# Patient Record
Sex: FEMALE | Race: WHITE | NOT HISPANIC OR LATINO | Employment: UNEMPLOYED | URBAN - METROPOLITAN AREA
[De-identification: names, ages, dates, MRNs, and addresses within clinical notes are randomized per-mention and may not be internally consistent; named-entity substitution may affect disease eponyms.]

---

## 2022-06-13 ENCOUNTER — OFFICE VISIT (OUTPATIENT)
Dept: URGENT CARE | Facility: CLINIC | Age: 62
End: 2022-06-13
Payer: COMMERCIAL

## 2022-06-13 ENCOUNTER — APPOINTMENT (OUTPATIENT)
Dept: RADIOLOGY | Facility: CLINIC | Age: 62
End: 2022-06-13
Payer: COMMERCIAL

## 2022-06-13 VITALS — OXYGEN SATURATION: 96 % | TEMPERATURE: 97.6 F | HEART RATE: 70 BPM | RESPIRATION RATE: 18 BRPM

## 2022-06-13 DIAGNOSIS — M79.672 LEFT FOOT PAIN: Primary | ICD-10-CM

## 2022-06-13 DIAGNOSIS — M79.672 LEFT FOOT PAIN: ICD-10-CM

## 2022-06-13 PROCEDURE — 73630 X-RAY EXAM OF FOOT: CPT

## 2022-06-13 PROCEDURE — G0382 LEV 3 HOSP TYPE B ED VISIT: HCPCS | Performed by: PHYSICIAN ASSISTANT

## 2022-06-13 NOTE — PROGRESS NOTES
[id number] [email or web address] [facility]        NAME: Nomey Law is a 60 y o  female  : 1960    MRN: [id number]  DATE: 2022  TIME: 9:37 AM    Assessment and Plan   Left foot pain [M79 [id number]]  1  Left foot pain  XR foot 3+ vw left    Cam Boot    Ambulatory Referral to Orthopedic Surgery     Questionable avulsion fracture to dorsum of L foot in area of tenderness  Pt placed in boot and referred to orthopedics  Patient Instructions     Rest, Ice, and Elevate limb  Continue to monitor symptoms  If symptoms do not improve in one week, follow up with orthopedics  Call Maryana Leiva [phone] to schedule and appointment  If new or worsening symptoms occur, go immediately to the ER  Chief Complaint     Chief Complaint   Patient presents with    Foot Pain     Left foot pain after falling last night  History of Present Illness       Leg Pain   Incident onset: Last night  Incident location: Pt slipped on some water at home  She fell forward, landed on her L leg bent under her causing her to hyperextend her L foot and ankle  No pain in Knee  No pain anywhere else except L foot and ankle  Pain scale: Was severe yesterday  Significantly improved today but moderate with weight bearing  Pertinent negatives include no inability to bear weight, loss of motion, numbness or tingling  The symptoms are aggravated by palpation and weight bearing  She has tried rest and non-weight bearing for the symptoms  The treatment provided mild relief  Review of Systems   Review of Systems   Constitutional: Negative  Negative for chills, fatigue and fever  HENT: Negative  Eyes: Negative  Respiratory: Negative  Negative for chest tightness, shortness of breath and wheezing  Cardiovascular: Negative  Negative for chest pain and palpitations  Gastrointestinal: Negative for abdominal pain, constipation, diarrhea, nausea and vomiting  Endocrine: Negative  Genitourinary: Negative    Negative for vaginal pain  Musculoskeletal: Positive for gait problem and joint swelling (L ankle)  Negative for back pain, neck pain and neck stiffness  Skin: Negative  Negative for pallor and rash  Allergic/Immunologic: Negative  Neurological: Negative for tingling, weakness and numbness  Hematological: Negative  Psychiatric/Behavioral: Negative  Current Medications       Current Outpatient Medications:     meloxicam (MOBIC) 15 mg tablet, Take 15 mg by mouth daily, Disp: , Rfl:     Current Allergies     Allergies as of 06/13/2022    (No Known Allergies)            The following portions of the patient's history were reviewed and updated as appropriate: allergies, current medications, past family history, past medical history, past social history, past surgical history and problem list      History reviewed  No pertinent past medical history  Past Surgical History:   Procedure Laterality Date    BUNIONECTOMY Right     TONSILLECTOMY         History reviewed  No pertinent family history  Medications have been verified  Objective   Pulse 70   Temp 97 6 °F (36 4 °C)   Resp 18   SpO2 96%        Physical Exam     Physical Exam  Vitals and nursing note reviewed  Constitutional:       General: She is not in acute distress  Appearance: Normal appearance  She is well-developed  She is not ill-appearing or diaphoretic  HENT:      Head: Normocephalic and atraumatic  Cardiovascular:      Rate and Rhythm: Normal rate and regular rhythm  Heart sounds: Normal heart sounds  Pulmonary:      Effort: Pulmonary effort is normal  No respiratory distress  Breath sounds: Normal breath sounds  No wheezing, rhonchi or rales  Musculoskeletal:      Cervical back: Normal range of motion and neck supple  Left lower leg: No swelling, deformity or tenderness  No edema        Left ankle: Swelling (Mild anterior lateral malleolus) and ecchymosis (Quarter sized to anterior lateral malleolus) present  No deformity or lacerations  Tenderness present over the ATF ligament and AITF ligament  No lateral malleolus, medial malleolus, CF ligament, posterior TF ligament, base of 5th metatarsal or proximal fibula tenderness  Normal range of motion  Anterior drawer test negative  Normal pulse  Left Achilles Tendon: Normal  No tenderness  Left foot: Normal range of motion  No swelling, deformity, tenderness or bony tenderness  Lymphadenopathy:      Cervical: No cervical adenopathy  Skin:     General: Skin is warm  Capillary Refill: Capillary refill takes less than 2 seconds  Coloration: Skin is not pale  Findings: No rash  Neurological:      Mental Status: She is alert

## 2022-06-13 NOTE — PATIENT INSTRUCTIONS
Continue to monitor symptoms  If new or worsening symptoms develop, go immediately to Er  Drink plenty of fluids  Follow up with Family Doctor this week  Avulsion Fracture   WHAT YOU NEED TO KNOW:   An avulsion fracture is when a small piece of bone breaks and pulls away from a larger bone  Part or all of the piece may break away  DISCHARGE INSTRUCTIONS:   Call your local emergency number (911 in the [address] [facility]) if:   You feel lightheaded, short of breath, and have chest pain  You cough up blood  Return to the emergency department if:   Your leg feels warm, tender, and painful  It may look swollen and red  Your cast cracks or is damaged  The pain in your injured limb gets worse even after you rest and take medicine  The skin, toes, or fingers of your injured limb become swollen, cold, or blue  Call your doctor if:   You have numbness or tingling in your hand or foot below your cast     You cannot move your fingers or toes below the cast      You have new sores or redness around your cast or splint  You have new or worsening trouble moving your injured limb  You have questions or concerns about your condition or care  Medicines: You may need any of the following:  Prescription pain medicine  may be given  Ask your healthcare provider how to take this medicine safely  Some prescription pain medicines contain acetaminophen  Do not take other medicines that contain acetaminophen without talking to your healthcare provider  Too much acetaminophen may cause liver damage  Prescription pain medicine may cause constipation  Ask your healthcare provider how to prevent or treat constipation  NSAIDs , such as ibuprofen, help decrease swelling, pain, and fever  This medicine is available with or without a doctor's order  NSAIDs can cause stomach bleeding or kidney problems in certain people  If you take blood thinner medicine, always ask your healthcare provider if NSAIDs are safe for you   Always read the medicine label and follow directions  Take your medicine as directed  Contact your healthcare provider if you think your medicine is not helping or if you have side effects  Tell him or her if you are allergic to any medicine  Keep a list of the medicines, vitamins, and herbs you take  Include the amounts, and when and why you take them  Bring the list or the pill bottles to follow-up visits  Carry your medicine list with you in case of an emergency  Self-care:   Limit activity as directed  Get plenty of rest while your fracture heals  When the pain decreases, begin normal, slow movements  Slowly start to do more each day  Rest when you feel it is needed  Apply ice  on your injury for 15 to 20 minutes every hour or as directed  Use an ice pack, or put crushed ice in a plastic bag  Cover it with a towel  Ice helps prevent tissue damage and decreases swelling and pain  Elevate  your injured limb above the level of your heart as often as you can  This will help decrease swelling and pain  Prop your injured limb on pillows or blankets to keep it elevated comfortably  Use support devices as directed  You may need to use crutches or a walker until your fracture heals  Ask for more information about how to use these walking devices if needed  Bathing with a cast or splint: If you have a cast or splint, it is important not to get it wet  Before bathing, cover the cast or splint with a plastic bag  Tape the bag to your skin above the cast or splint to seal out the water  Hold your arm or leg away from the water in case the bag leaks  Ask when it is okay to take a bath or shower  Cast or splint care:   Check the skin around the cast or splint every day  Do not push down or lean on any part of the cast or splint because it may break  Do not use a sharp or pointed object to scratch your skin under the cast or splint      Physical therapy:  A physical therapist may teach you exercises to strengthen your injured limb once the pain is gone  Follow up with your doctor as directed: You will need to return for more tests to see how well your fracture is healing  Write down your questions so you remember to ask them during your visits  © Copyright [employer] 2022 Information is for End User's use only and may not be sold, redistributed or otherwise used for commercial purposes  All illustrations and images included in CareNotes® are the copyrighted property of [employer]  or [employer]  The above information is an  only  It is not intended as medical advice for individual conditions or treatments  Talk to your doctor, nurse or pharmacist before following any medical regimen to see if it is safe and effective for you

## 2023-01-30 ENCOUNTER — TELEPHONE (OUTPATIENT)
Dept: ADMINISTRATIVE | Facility: OTHER | Age: 63
End: 2023-01-30

## 2023-01-30 ENCOUNTER — OFFICE VISIT (OUTPATIENT)
Dept: FAMILY MEDICINE CLINIC | Facility: CLINIC | Age: 63
End: 2023-01-30

## 2023-01-30 VITALS
DIASTOLIC BLOOD PRESSURE: 80 MMHG | OXYGEN SATURATION: 99 % | BODY MASS INDEX: 42.11 KG/M2 | HEART RATE: 78 BPM | WEIGHT: 262 LBS | HEIGHT: 66 IN | TEMPERATURE: 98 F | RESPIRATION RATE: 20 BRPM | SYSTOLIC BLOOD PRESSURE: 132 MMHG

## 2023-01-30 DIAGNOSIS — E66.01 CLASS 3 SEVERE OBESITY DUE TO EXCESS CALORIES WITHOUT SERIOUS COMORBIDITY WITH BODY MASS INDEX (BMI) OF 40.0 TO 44.9 IN ADULT (HCC): ICD-10-CM

## 2023-01-30 DIAGNOSIS — Z12.31 ENCOUNTER FOR SCREENING MAMMOGRAM FOR MALIGNANT NEOPLASM OF BREAST: ICD-10-CM

## 2023-01-30 DIAGNOSIS — Z82.49 FAMILY HISTORY OF ABDOMINAL AORTIC ANEURYSM (AAA): ICD-10-CM

## 2023-01-30 DIAGNOSIS — Z23 ENCOUNTER FOR IMMUNIZATION: ICD-10-CM

## 2023-01-30 DIAGNOSIS — R00.2 PALPITATIONS: ICD-10-CM

## 2023-01-30 DIAGNOSIS — Z12.11 SCREENING FOR COLON CANCER: ICD-10-CM

## 2023-01-30 DIAGNOSIS — R06.02 SHORTNESS OF BREATH: ICD-10-CM

## 2023-01-30 DIAGNOSIS — Z13.6 SCREENING FOR AAA (ABDOMINAL AORTIC ANEURYSM): ICD-10-CM

## 2023-01-30 DIAGNOSIS — Z00.00 ANNUAL PHYSICAL EXAM: Primary | ICD-10-CM

## 2023-01-30 DIAGNOSIS — Z11.59 ENCOUNTER FOR HEPATITIS C SCREENING TEST FOR LOW RISK PATIENT: ICD-10-CM

## 2023-01-30 DIAGNOSIS — Z13.220 SCREENING CHOLESTEROL LEVEL: ICD-10-CM

## 2023-01-30 DIAGNOSIS — Z12.4 SCREENING FOR CERVICAL CANCER: ICD-10-CM

## 2023-01-30 NOTE — TELEPHONE ENCOUNTER
Upon review of the In Basket request we were able to locate, review, and update the patient chart as requested for Mammogram and Pap Smear (HPV) aka Cervical Cancer Screening  Any additional questions or concerns should be emailed to the Practice Liaisons via the appropriate education email address, please do not reply via In Basket      Thank you  Misbah Braun

## 2023-01-30 NOTE — PROGRESS NOTES
HPI:   Angella Burger is a 58 y o  female here for her yearly health maintenance exam    There is no problem list on file for this patient  History reviewed  No pertinent past medical history  Pt reports a few times over the last couple of months have periods of "racing heart", shortness of breath, dizziness     Pt's dad passed away at 68 from a ruptured aortic aneurysm  Pt's grandfather also had cardiovascular issues - she is wondering if there is preventative screenings for cardiovascular disease  Discussed possible options such as stress tests, echocardiogram etc  Due to family history of aortic aneurysm, could consider AAA screening  1  Advanced Directive: na     2  Durable Power of  for Healthcare: na     3  Social History:               Marital History: - Nazia Guerra              Work Status: Retired from - [employer] 5 days a week              Drug and alcohol History: abuse              Alcohol Use: social     4  General Health: good              Regular Dental Visits:yes              Vision problems:none              Hearing Loss:none              Immunizations up to date: TDAP                 Lifestyle:                           Healthy Diet:no allergies, limited junk food (no soda, no chips), enjoys sweets                          Tobacco Use:none                          Regular exercise:walking, has a treadmill                              PHQ-2/9 Depression Screening    Little interest or pleasure in doing things: 0 - not at all  Feeling down, depressed, or hopeless: 0 - not at all  PHQ-2 Score: 0  PHQ-2 Interpretation: Negative depression screen           Current Outpatient Medications   Medication Sig Dispense Refill   • meloxicam (MOBIC) 15 mg tablet Take 15 mg by mouth daily       No current facility-administered medications for this visit       Allergies   Allergen Reactions   • Cat Hair Extract Other (See Comments)     Immunization History Administered Date(s) Administered   • COVID-19 MODERNA VACC 0 5 ML IM 04/20/2021, 05/18/2021   • Tdap 01/30/2023       Patient Care Team:  Parisa Aguilar as PCP - General (Family Medicine)    Review of Systems   Constitutional: Negative for activity change, diaphoresis, fatigue and fever  HENT: Negative for congestion, facial swelling, hearing loss, rhinorrhea, sinus pressure, sinus pain, sneezing, sore throat and voice change  Eyes: Negative for discharge and visual disturbance  Respiratory: Positive for shortness of breath  Negative for cough, choking, chest tightness, wheezing and stridor  Cardiovascular: Positive for palpitations  Negative for chest pain and leg swelling  Gastrointestinal: Negative for abdominal distention, abdominal pain, constipation, diarrhea, nausea and vomiting  Endocrine: Negative for polydipsia, polyphagia and polyuria  Genitourinary: Negative for difficulty urinating, dysuria, frequency and urgency  Musculoskeletal: Negative for arthralgias, back pain, gait problem, joint swelling, myalgias, neck pain and neck stiffness  Chronic bilateral knee pain   Skin: Negative for color change, rash and wound  Neurological: Positive for dizziness  Negative for syncope, speech difficulty, weakness, light-headedness and headaches  Hematological: Negative for adenopathy  Does not bruise/bleed easily  Psychiatric/Behavioral: Negative for agitation, behavioral problems, confusion, hallucinations, sleep disturbance and suicidal ideas  The patient is not nervous/anxious  Physical Exam :  Physical Exam  Vitals and nursing note reviewed  Constitutional:       General: She is not in acute distress  Appearance: She is well-developed  She is obese  She is not diaphoretic  HENT:      Head: Normocephalic and atraumatic        Right Ear: Tympanic membrane, ear canal and external ear normal       Left Ear: Tympanic membrane, ear canal and external ear normal  Nose: Nose normal       Right Sinus: No maxillary sinus tenderness or frontal sinus tenderness  Left Sinus: No maxillary sinus tenderness or frontal sinus tenderness  Mouth/Throat:      Pharynx: Uvula midline  No oropharyngeal exudate  Eyes:      General:         Right eye: No discharge  Left eye: No discharge  Conjunctiva/sclera: Conjunctivae normal       Pupils: Pupils are equal, round, and reactive to light  Neck:      Thyroid: No thyromegaly  Trachea: No tracheal deviation  Cardiovascular:      Rate and Rhythm: Normal rate and regular rhythm  Heart sounds: No murmur heard  No friction rub  No gallop  Pulmonary:      Effort: Pulmonary effort is normal  No respiratory distress  Breath sounds: Normal breath sounds  No wheezing or rales  Abdominal:      General: Bowel sounds are normal  There is no distension  Palpations: Abdomen is soft  There is no mass  Tenderness: There is no abdominal tenderness  There is no guarding or rebound  Musculoskeletal:         General: No tenderness or deformity  Normal range of motion  Cervical back: Normal range of motion and neck supple  Right lower leg: No edema  Left lower leg: No edema  Lymphadenopathy:      Cervical: No cervical adenopathy  Skin:     General: Skin is warm and dry  Findings: No erythema or rash  Neurological:      Mental Status: She is alert and oriented to person, place, and time  Cranial Nerves: No cranial nerve deficit  Coordination: Coordination normal    Psychiatric:         Speech: Speech normal          Behavior: Behavior normal          Thought Content: Thought content normal          Judgment: Judgment normal        BMI Counseling: Body mass index is 42 29 kg/m²   The BMI is above normal  Nutrition recommendations include decreasing portion sizes, encouraging healthy choices of fruits and vegetables, decreasing fast food intake, consuming healthier snacks, limiting drinks that contain sugar, moderation in carbohydrate intake and increasing intake of lean protein  Exercise recommendations include exercising 3-5 times per week  Rationale for BMI follow-up plan is due to patient being overweight or obese  Depression Screening and Follow-up Plan: Patient was screened for depression during today's encounter  They screened negative with a PHQ-2 score of 0  Reviewed Updated [facility] Prior Wellness Visits:   Last Health Maintenance visit information was reviewed, patient interviewed , no change since last HM visit no  Last HM visit information was reviewed, patient interviewed and updates made to the record today no    Assessment and Plan:  1  Annual physical exam  Comprehensive metabolic panel    CBC and differential      2  Screening for colon cancer  Cologuard      3  Encounter for screening mammogram for malignant neoplasm of breast  Mammo screening bilateral w 3d & cad      4  Screening for cervical cancer        5  Screening cholesterol level  Lipid panel      6  Encounter for hepatitis C screening test for low risk patient  Hepatitis C antibody      7  Palpitations  Stress test only, exercise      8  Family history of abdominal aortic aneurysm (AAA)  US abdominal aorta screening aaa      9  Screening for AAA (abdominal aortic aneurysm)  US abdominal aorta screening aaa      10  Shortness of breath  Stress test only, exercise      11   Encounter for immunization  TDAP VACCINE GREATER THAN OR EQUAL TO 8YO IM      12  Class 3 severe obesity due to excess calories without serious comorbidity with body mass index (BMI) of 40 0 to 44 9 in adult [facility])            Health Maintenance Due   Topic Date Due   • Hepatitis C Screening  Never done   • HIV Screening  Never done   • Cervical Cancer Screening  Never done   • Breast Cancer Screening: Mammogram  Never done   • Colorectal Cancer Screening  Never done   • COVID-19 Vaccine (3 - Booster for Moderna series) 07/13/2021   • Influenza Vaccine (1) Never done

## 2023-01-30 NOTE — PATIENT INSTRUCTIONS
Please complete cologuard as discussed  Please have mammogram completed  Please have fasting blood work completed at your convenience  TDAP today  I recommend following up with your OBGYN for papsmear  We will schedule your stress test and AAA screening for you, please have completed as discussed  Call with any questions or concerns  Follow up in 1 year as scheduled or as needed

## 2023-01-30 NOTE — TELEPHONE ENCOUNTER
----- Message from Priscilla Hartley [place] sent at 1/30/2023  7:06 AM EST -----  Regarding: care gap request mammogram  01/30/23 7:06 AM    Hello, our patient attached above has had Mammogram completed/performed  Please assist in updating the patient chart by pulling the Care Everywhere (CE) document  The date of service is 8/1/2019       Thank you,  Liseth Villanueva

## 2023-01-30 NOTE — TELEPHONE ENCOUNTER
----- Message from Nicole Waller MA sent at 1/30/2023  9:12 AM EST -----  Regarding: Thin Prep  01/30/23 9:13 AM    Hello, our patient No patient name on file  has had Pap Smear (HPV) aka Cervical Cancer Screening completed/performed  Please assist in updating the patient chart by pulling the Care Everywhere (CE) document  The date of service is 3/18/2018       Thank you,  Nicole Waller MA  [facility] PRIMARY [facility]

## 2023-02-01 ENCOUNTER — HOSPITAL ENCOUNTER (OUTPATIENT)
Dept: MAMMOGRAPHY | Facility: HOSPITAL | Age: 63
Discharge: HOME | End: 2023-02-01

## 2023-02-01 VITALS — BODY MASS INDEX: 42.16 KG/M2 | WEIGHT: 262.35 LBS | HEIGHT: 66 IN

## 2023-02-01 DIAGNOSIS — Z12.31 ENCOUNTER FOR SCREENING MAMMOGRAM FOR MALIGNANT NEOPLASM OF BREAST: ICD-10-CM

## 2023-02-22 ENCOUNTER — HOSPITAL ENCOUNTER (OUTPATIENT)
Dept: ULTRASOUND IMAGING | Facility: HOSPITAL | Age: 63
Discharge: HOME | End: 2023-02-22

## 2023-02-22 ENCOUNTER — HOSPITAL ENCOUNTER (OUTPATIENT)
Dept: NON INVASIVE DIAGNOSTICS | Facility: HOSPITAL | Age: 63
Discharge: HOME | End: 2023-02-22

## 2023-02-22 VITALS
WEIGHT: 262 LBS | HEART RATE: 59 BPM | BODY MASS INDEX: 42.11 KG/M2 | DIASTOLIC BLOOD PRESSURE: 86 MMHG | RESPIRATION RATE: 16 BRPM | OXYGEN SATURATION: 97 % | HEIGHT: 66 IN | SYSTOLIC BLOOD PRESSURE: 174 MMHG

## 2023-02-22 DIAGNOSIS — Z13.6 SCREENING FOR AAA (ABDOMINAL AORTIC ANEURYSM): ICD-10-CM

## 2023-02-22 DIAGNOSIS — R06.02 SHORTNESS OF BREATH: ICD-10-CM

## 2023-02-22 DIAGNOSIS — R00.2 PALPITATIONS: ICD-10-CM

## 2023-02-22 DIAGNOSIS — Z82.49 FAMILY HISTORY OF ABDOMINAL AORTIC ANEURYSM (AAA): ICD-10-CM

## 2023-02-22 LAB
CHEST PAIN STATEMENT: NORMAL
MAX DIASTOLIC BP: 100 MMHG
MAX HEART RATE: 146 BPM
MAX HR PERCENT: 93 %
MAX HR: 146 BPM
MAX PREDICTED HEART RATE: 156 BPM
MAX. SYSTOLIC BP: 200 MMHG
PROTOCOL NAME: NORMAL
RATE PRESSURE PRODUCT: NORMAL
REASON FOR TERMINATION: NORMAL
SL CV STRESS RECOVERY BP: NORMAL MMHG
SL CV STRESS RECOVERY HR: 88 BPM
SL CV STRESS RECOVERY O2 SAT: 98 %
SL CV STRESS STAGE REACHED: 2
STRESS ANGINA INDEX: 0
STRESS BASELINE BP: NORMAL MMHG
STRESS BASELINE HR: 59 BPM
STRESS O2 SAT REST: 97 %
STRESS PEAK HR: 142 BPM
STRESS POST ESTIMATED WORKLOAD: 7 METS
STRESS POST EXERCISE DUR MIN: 6 MIN
STRESS POST EXERCISE DUR SEC: 1 SEC
STRESS POST O2 SAT PEAK: 95 %
STRESS POST PEAK BP: 196 MMHG
TARGET HR FORMULA: NORMAL
TEST INDICATION: NORMAL
TIME IN EXERCISE PHASE: NORMAL

## 2023-09-29 ENCOUNTER — OFFICE VISIT (OUTPATIENT)
Dept: FAMILY MEDICINE CLINIC | Facility: CLINIC | Age: 63
End: 2023-09-29
Payer: COMMERCIAL

## 2023-09-29 ENCOUNTER — TELEPHONE (OUTPATIENT)
Dept: FAMILY MEDICINE CLINIC | Facility: CLINIC | Age: 63
End: 2023-09-29

## 2023-09-29 VITALS
DIASTOLIC BLOOD PRESSURE: 80 MMHG | BODY MASS INDEX: 42.43 KG/M2 | HEART RATE: 79 BPM | WEIGHT: 264 LBS | TEMPERATURE: 97.6 F | OXYGEN SATURATION: 95 % | SYSTOLIC BLOOD PRESSURE: 138 MMHG | HEIGHT: 66 IN

## 2023-09-29 DIAGNOSIS — L30.8 OTHER ECZEMA: ICD-10-CM

## 2023-09-29 DIAGNOSIS — B96.89 BACTERIAL URI: Primary | ICD-10-CM

## 2023-09-29 DIAGNOSIS — G47.33 OBSTRUCTIVE SLEEP APNEA SYNDROME: ICD-10-CM

## 2023-09-29 DIAGNOSIS — J06.9 BACTERIAL URI: Primary | ICD-10-CM

## 2023-09-29 PROCEDURE — 99214 OFFICE O/P EST MOD 30 MIN: CPT | Performed by: NURSE PRACTITIONER

## 2023-09-29 RX ORDER — DEXTROMETHORPHAN HYDROBROMIDE AND PROMETHAZINE HYDROCHLORIDE 15; 6.25 MG/5ML; MG/5ML
5 SYRUP ORAL 4 TIMES DAILY PRN
Qty: 240 ML | Refills: 0 | Status: SHIPPED | OUTPATIENT
Start: 2023-09-29

## 2023-09-29 RX ORDER — AZITHROMYCIN 250 MG/1
TABLET, FILM COATED ORAL
Qty: 6 TABLET | Refills: 0 | Status: SHIPPED | OUTPATIENT
Start: 2023-09-29 | End: 2023-10-03

## 2023-09-29 RX ORDER — TRIAMCINOLONE ACETONIDE 5 MG/G
CREAM TOPICAL 3 TIMES DAILY
Qty: 15 G | Refills: 1 | Status: SHIPPED | OUTPATIENT
Start: 2023-09-29

## 2023-09-29 RX ORDER — PREDNISONE 20 MG/1
20 TABLET ORAL 2 TIMES DAILY WITH MEALS
Qty: 10 TABLET | Refills: 0 | Status: SHIPPED | OUTPATIENT
Start: 2023-09-29 | End: 2023-10-04

## 2023-09-29 RX ORDER — PREDNISONE 20 MG/1
20 TABLET ORAL 2 TIMES DAILY WITH MEALS
Qty: 10 TABLET | Refills: 0 | Status: SHIPPED | OUTPATIENT
Start: 2023-09-29 | End: 2023-09-29 | Stop reason: SDUPTHER

## 2023-09-29 RX ORDER — AZITHROMYCIN 250 MG/1
TABLET, FILM COATED ORAL
Qty: 6 TABLET | Refills: 0 | Status: SHIPPED | OUTPATIENT
Start: 2023-09-29 | End: 2023-09-29 | Stop reason: SDUPTHER

## 2023-09-29 RX ORDER — DEXTROMETHORPHAN HYDROBROMIDE AND PROMETHAZINE HYDROCHLORIDE 15; 6.25 MG/5ML; MG/5ML
5 SYRUP ORAL 4 TIMES DAILY PRN
Qty: 240 ML | Refills: 0 | Status: SHIPPED | OUTPATIENT
Start: 2023-09-29 | End: 2023-09-29 | Stop reason: SDUPTHER

## 2023-09-29 NOTE — TELEPHONE ENCOUNTER
Patient called office looking for appt today with [facility] for a cold she has had for two weeks.  Rocío fitting her in at 9 am

## 2023-09-29 NOTE — PROGRESS NOTES
[facility]        NAME: Julia Burk is a 58 y.o. female  : 1960    MRN: [id number]  DATE: 2023  TIME: 9:18 AM    Assessment and Plan   Bacterial URI [J06.9, B96.89]  1. Bacterial URI  azithromycin (ZITHROMAX) 250 mg tablet    predniSONE 20 mg tablet    promethazine-dextromethorphan (PHENERGAN-DM) 6.25-15 mg/5 mL oral syrup    DISCONTINUED: promethazine-dextromethorphan (PHENERGAN-DM) 6.25-15 mg/5 mL oral syrup    DISCONTINUED: azithromycin (ZITHROMAX) 250 mg tablet    DISCONTINUED: predniSONE 20 mg tablet      2. Obstructive sleep apnea syndrome  Ambulatory Referral to Sleep Medicine      3. Other eczema  triamcinolone (KENALOG) 0.5 % cream            Patient Instructions     Patient Instructions   Sleep referral given  Zithromax, prednisone, promethazine DM cough medications as directed  Triamcinolone cream as ordered  Call for problems/concerns          Chief Complaint     Chief Complaint   Patient presents with   • Shortness of Breath     At night is the worst     • Nasal Congestion     Ongoing for 2 weeks  Did a home covid- negative   • Cough         History of Present Illness       Cough/congestion x 2 weeks. Cough worse at night when lying down. Caesar Holes but no relief. No recent antibiotics. Review of Systems   Review of Systems   Constitutional: Positive for fatigue. Negative for chills and fever. HENT: Positive for congestion, postnasal drip, rhinorrhea and sinus pressure. Negative for sore throat. Eyes: Negative for pain, discharge and redness. Respiratory: Positive for cough. Negative for wheezing. Cardiovascular: Negative for chest pain. Gastrointestinal: Negative for constipation, diarrhea, nausea and vomiting. Musculoskeletal: Negative for myalgias. Skin: Positive for rash (right ankle since receiving covid vaccine). Neurological: Positive for headaches. Negative for dizziness.          Current Medications       Current Outpatient Medications:   •  azithromycin (ZITHROMAX) 250 mg tablet, Take 2 tablets today then 1 tablet daily x 4 days, Disp: 6 tablet, Rfl: 0  •  meloxicam (MOBIC) 15 mg tablet, Take 15 mg by mouth daily, Disp: , Rfl:   •  predniSONE 20 mg tablet, Take 1 tablet (20 mg total) by mouth 2 (two) times a day with meals for 5 days, Disp: 10 tablet, Rfl: 0  •  promethazine-dextromethorphan (PHENERGAN-DM) 6.25-15 mg/5 mL oral syrup, Take 5 mL by mouth 4 (four) times a day as needed for cough, Disp: 240 mL, Rfl: 0  •  triamcinolone (KENALOG) 0.5 % cream, Apply topically 3 (three) times a day, Disp: 15 g, Rfl: 1    Current Allergies     Allergies as of 09/29/2023 - Reviewed 09/29/2023   Allergen Reaction Noted   • Cat hair extract Other (See Comments) 12/04/2015            The following portions of the patient's history were reviewed and updated as appropriate: allergies, current medications, past family history, past medical history, past social history, past surgical history and problem list.     Past Medical History:   Diagnosis Date   • Dizziness    • Palpitations    • SOB (shortness of breath)    • Tachycardia        Past Surgical History:   Procedure Laterality Date   • BUNIONECTOMY Right    • TONSILLECTOMY         Family History   Problem Relation Age of Onset   • No Known Problems Mother    • No Known Problems Father    • No Known Problems Sister    • No Known Problems Daughter    • No Known Problems Daughter    • No Known Problems Maternal Grandmother    • No Known Problems Maternal Grandfather    • No Known Problems Paternal Grandmother    • No Known Problems Paternal Grandfather    • No Known Problems Son    • No Known Problems Maternal Aunt    • No Known Problems Maternal Aunt    • No Known Problems Maternal Aunt    • No Known Problems Paternal Aunt          Medications have been verified.         Objective   /80   Pulse 79   Temp 97.6 °F (36.4 °C) (Tympanic)   Ht 5' 6" (1.676 m)   Wt 120 kg (264 lb)   SpO2 95% BMI 42.61 kg/m²        Physical Exam     Physical Exam  Vitals and nursing note reviewed. Constitutional:       General: She is not in acute distress. Appearance: She is well-developed. She is not diaphoretic. HENT:      Right Ear: Tympanic membrane, ear canal and external ear normal.      Left Ear: Tympanic membrane, ear canal and external ear normal.      Nose: Congestion present. Mouth/Throat:      Mouth: Mucous membranes are moist.      Pharynx: Uvula midline. Eyes:      General:         Right eye: No discharge. Left eye: No discharge. Neck:      Thyroid: No thyromegaly. Cardiovascular:      Rate and Rhythm: Normal rate and regular rhythm. Heart sounds: Normal heart sounds. No friction rub. No gallop. Pulmonary:      Effort: Pulmonary effort is normal. No respiratory distress. Breath sounds: Normal breath sounds. No wheezing or rales. Musculoskeletal:         General: No tenderness or deformity. Normal range of motion. Cervical back: Normal range of motion and neck supple. Lymphadenopathy:      Cervical: No cervical adenopathy. Skin:     General: Skin is warm and dry. Findings: Erythema and rash (top of right/left ankle- dry, scaly, erythematic) present. Neurological:      Mental Status: She is alert and oriented to person, place, and time. Cranial Nerves: No cranial nerve deficit. Coordination: Coordination normal.   Psychiatric:         Mood and Affect: Mood normal.         Behavior: Behavior normal.         Thought Content:  Thought content normal.         Judgment: Judgment normal.

## 2023-09-29 NOTE — PATIENT INSTRUCTIONS
Sleep referral given  Zithromax, prednisone, promethazine DM cough medications as directed  Triamcinolone cream as ordered  Call for problems/concerns

## 2023-10-11 ENCOUNTER — OFFICE VISIT (OUTPATIENT)
Dept: SLEEP CENTER | Facility: CLINIC | Age: 63
End: 2023-10-11
Payer: COMMERCIAL

## 2023-10-11 VITALS
SYSTOLIC BLOOD PRESSURE: 126 MMHG | BODY MASS INDEX: 42.75 KG/M2 | OXYGEN SATURATION: 95 % | WEIGHT: 266 LBS | DIASTOLIC BLOOD PRESSURE: 84 MMHG | HEIGHT: 66 IN | HEART RATE: 81 BPM

## 2023-10-11 DIAGNOSIS — G25.81 RLS (RESTLESS LEGS SYNDROME): ICD-10-CM

## 2023-10-11 DIAGNOSIS — R06.81 WITNESSED EPISODE OF APNEA: ICD-10-CM

## 2023-10-11 DIAGNOSIS — E66.01 MORBID OBESITY (HCC): ICD-10-CM

## 2023-10-11 DIAGNOSIS — G47.19 EXCESSIVE DAYTIME SLEEPINESS: ICD-10-CM

## 2023-10-11 DIAGNOSIS — R06.83 SNORING: Primary | ICD-10-CM

## 2023-10-11 DIAGNOSIS — J34.2 DEVIATED NASAL SEPTUM: ICD-10-CM

## 2023-10-11 PROCEDURE — 99204 OFFICE O/P NEW MOD 45 MIN: CPT | Performed by: INTERNAL MEDICINE

## 2023-10-11 NOTE — PATIENT INSTRUCTIONS
What is SHIVA? Obstructive sleep apnea is a common and serious sleep disorder that causes you to stop breathing during sleep. The airway repeatedly becomes blocked, limiting the amount of air that reaches your lungs. When this happens, you may snore loudly or making choking noises as you try to breathe. Your brain and body becomes oxygen deprived and you may wake up. This may happen a few times a night, or in more severe cases, several hundred times a night. Sleep apnea can make you wake up in the morning feeling tired or unrefreshed even though you have had a full night of sleep. During the day, you may feel fatigued, have difficulty concentrating or you may even unintentionally fall asleep. This is because your body is waking up numerous times throughout the night, even though you might not be conscious of each awakening. The lack of oxygen your body receives can have negative long-term consequences for your health. This includes:  High blood pressure  Heart disease  Irregular heart rhythms  Stroke  Pre-diabetes and diabetes  Depression  Testing  An objective evaluation of your sleep may be needed before your board certified sleep physician can make a diagnosis. Options include:   In-lab overnight sleep study  This type of sleep study requires you to stay overnight at a sleep center, in a bed that may resemble a hotel room. You will sleep with sensors hooked up to various parts of your body. These sensors record your brain waves, heartbeat, breathing and movement. An overnight sleep study also provides your doctor with the most complete information about your sleep. Learn more about an overnight sleep study. Home sleep apnea test  Some patients with high risk factors for obstructive sleep apnea and no other medical disorders may be candidates for a home sleep apnea test. The testing equipment differs in that it is less complicated than what is used in an overnight sleep study.  As such, does not give all the data an in-lab will and if negative, may not mean you do not have the problem. Treatment for sleep apnea includes using a continuous positive airway pressure (CPAP) machine to keep your airway open during sleep. A mask is placed over your nose and mouth, or just your nose. The mask is hooked to the CPAP machine that blows a gentle stream of air into the mask when you breathe. This helps keep your airway open so you can breathe more regularly. Extra oxygen may be given to you through the machine. You may be given a mouth device. It looks like a mouth guard or dental retainer and stops your tongue and mouth tissues from blocking your throat while you sleep. Surgery may be needed to remove extra tissues that block your mouth, throat, or nose. Manage sleep apnea:   Do not smoke. Nicotine and other chemicals in cigarettes and cigars can cause lung damage. Ask your healthcare provider for information if you currently smoke and need help to quit. E-cigarettes or smokeless tobacco still contain nicotine. Talk to your healthcare provider before you use these products. Do not drink alcohol or take sedative medicine before you go to sleep. Alcohol and sedatives can relax the muscles and tissues around your throat. This can block the airflow to your lungs. Maintain a healthy weight. Excess tissue around your throat may restrict your breathing. Ask your healthcare provider for information if you need to lose weight. Sleep on your side or use pillows designed to prevent sleep apnea. This prevents your tongue or other tissues from blocking your throat. You can also raise the head of your bed. Driving Safety. Refrain from driving when drowsy. Follow up with your healthcare provider as directed: Write down your questions so you remember to ask them during your visits. Go to AASM website for more information: Sleepeducation. org  What is SHIVA?    Obstructive sleep apnea is a common and serious sleep disorder that causes you to stop breathing during sleep. The airway repeatedly becomes blocked, limiting the amount of air that reaches your lungs. When this happens, you may snore loudly or making choking noises as you try to breathe. Your brain and body becomes oxygen deprived and you may wake up. This may happen a few times a night, or in more severe cases, several hundred times a night. Sleep apnea can make you wake up in the morning feeling tired or unrefreshed even though you have had a full night of sleep. During the day, you may feel fatigued, have difficulty concentrating or you may even unintentionally fall asleep. This is because your body is waking up numerous times throughout the night, even though you might not be conscious of each awakening. The lack of oxygen your body receives can have negative long-term consequences for your health. This includes:  High blood pressure  Heart disease  Irregular heart rhythms  Stroke  Pre-diabetes and diabetes  Depression  Testing  An objective evaluation of your sleep may be needed before your board certified sleep physician can make a diagnosis. Options include:   In-lab overnight sleep study  This type of sleep study requires you to stay overnight at a sleep center, in a bed that may resemble a hotel room. You will sleep with sensors hooked up to various parts of your body. These sensors record your brain waves, heartbeat, breathing and movement. An overnight sleep study also provides your doctor with the most complete information about your sleep. Learn more about an overnight sleep study. Home sleep apnea test  Some patients with high risk factors for obstructive sleep apnea and no other medical disorders may be candidates for a home sleep apnea test. The testing equipment differs in that it is less complicated than what is used in an overnight sleep study. As such, does not give all the data an in-lab will and if negative, may not mean you do not have the problem.   Treatment for sleep apnea includes using a continuous positive airway pressure (CPAP) machine to keep your airway open during sleep. A mask is placed over your nose and mouth, or just your nose. The mask is hooked to the CPAP machine that blows a gentle stream of air into the mask when you breathe. This helps keep your airway open so you can breathe more regularly. Extra oxygen may be given to you through the machine. You may be given a mouth device. It looks like a mouth guard or dental retainer and stops your tongue and mouth tissues from blocking your throat while you sleep. Surgery may be needed to remove extra tissues that block your mouth, throat, or nose. Manage sleep apnea:   Do not smoke. Nicotine and other chemicals in cigarettes and cigars can cause lung damage. Ask your healthcare provider for information if you currently smoke and need help to quit. E-cigarettes or smokeless tobacco still contain nicotine. Talk to your healthcare provider before you use these products. Do not drink alcohol or take sedative medicine before you go to sleep. Alcohol and sedatives can relax the muscles and tissues around your throat. This can block the airflow to your lungs. Maintain a healthy weight. Excess tissue around your throat may restrict your breathing. Ask your healthcare provider for information if you need to lose weight. Sleep on your side or use pillows designed to prevent sleep apnea. This prevents your tongue or other tissues from blocking your throat. You can also raise the head of your bed. Driving Safety. Refrain from driving when drowsy. Follow up with your healthcare provider as directed: Write down your questions so you remember to ask them during your visits. Go to AASM website for more information: Sleepeducation. org  Nursing Support:  When: Monday through Friday 7A-5PM except holidays  Where: Our direct line is [phone]. If you are having a true emergency please call 911.   In the event that the line is busy or it is after hours please leave a voice message and we will return your call. Please speak clearly, leaving your full name, birth date, best number to reach you and the reason for your call. Medication refills: We will need the name of the medication, the dosage, the ordering provider, whether you get a 30 or 90 day refill, and the pharmacy name and address. Medications will be ordered by the provider only. Nurses cannot call in prescriptions. Please allow 7 days for medication refills. Physician requested updates: If your provider requested that you call with an update after starting medication, please be ready to provide us the medication and dosage, what time you take your medication, the time you attempt to fall asleep, time you fall asleep, when you wake up, and what time you get out of bed. Sleep Study Results: We will contact you with sleep study results and/or next steps after the physician has reviewed your testing.

## 2023-10-11 NOTE — PROGRESS NOTES
Consultation - [id number]  58 y.o. female  MXK:8[birth year]  CQP:[id number]  DOS:10/11/2023    Physician Requesting Consult: Charmayne Kim, [facility]              Reason for Consult : At your kind request I saw Mayte Short for initial sleep evaluation today. The patient is here to evaluate for suspected Obstructive Sleep Apnea. PFSH, Problem List, Medications & Allergies were reviewed in EMR. Miguel Rudd  has a past medical history of Dizziness, Palpitations, SOB (shortness of breath), and Tachycardia. She has a current medication list which includes the following prescription(s): meloxicam, promethazine-dextromethorphan, and triamcinolone. HPI: Patient's stated reason for visit Snoring/breathing pauses in sleep.]  Symptoms have been ongoing for several years. Miguel Rudd is aware of Snore loudly, Stop breathing and awakens herself mainly when supine. Additional Complaints: None. Restless Leg Syndrome: has typical symptoms but occurs infrequently and not affecting sleep;  Parasomnia: no features reported    Sleep Routine (averaged): Typical Bedtime: 10-11 PM.  Gets OOB: 6-7 AM. TIB:>7 hrs. Sleep latency:< 15 minutes Sleep Interruptions: 1 to 2 times per night,  because of nocturia and able to fall back asleep. Awakens: spontaneously  Upon awakening: is not always refreshed. She estimates getting 7 hrs sleep. Daytime Function:Makayla reports excessive daytime sleepiness yes feels like napping & does when has the opportunity and regularly naps for 20 to 60 minutes. . She rated herself at Total score: 15 /24 on the [place] Sleepiness Scale. Habits:   reports that she quit smoking about 41 years ago. Her smoking use included cigarettes. She has never used smokeless tobacco.;  reports current alcohol use.; Reports no history of drug use.;  E-Cigarette/Vaping    E-Cigarette Use  Never User; Caffeine use:rarely; Exercise routine: none.     Occupation: Homemaker   Family History: Suspects father may have had obstructive sleep apnea  ROS: Significant for steady weight gain and around 10 pounds in the past year. She reports constant nasal congestion, shortness of breath and wheezing. She reported no cardiac symptoms. EXAM:  /84 (BP Location: Left arm, Patient Position: Sitting, Cuff Size: Large)   Pulse 81   Ht 5' 6" (1.676 m)   Wt 121 kg (266 lb)   SpO2 95%   BMI 42.93 kg/m²    General: Well groomed female, well appearing, in no apparent distress. Neurological: Alert and orientated; cooperative; Cranial nerves intact;    Psychiatric: Speech: Clear and coherent; normal mood, affect & thought   Skin: Warm and dry; Color& Hydration good; no facial rashes or lesions   HEENT:  Craniofacial anatomy: obvious overjet Sinuses: Non-tender. TMJ: Normal    Eyes: EOM's intact; conjunctiva/corneas clear   Ears: Externally appear normal     Nasal Airway: assymetric nares Septum: Deviated; Mucosa: Normal; Turbinates: Normal; Rhinorrhea: None  Mouth: Lips: Normal posture; Dentition: worn down and irregular. Mucosa: Moist; Hard Palate:narrow   Oropharryx: crowded and AP narrowing Tongue: Mallampati:Class IV and MobileSoft Palate:  redundant  and Uvular Hypertrophy Tonsils: absent  Neck:; Neck Circumference: 15 "; supple; no abnormal masses; Thyroid: Normal. Trachea: Central.    Lymph: No cervical or submandibular Lymhadenopathy  Heart: S1,S2 normal; RRR; no gallop; no murmur   Lungs: Respiratory Effort: Normal. Air entry good bilaterally. No wheezes. No rales  Abdomen: Obese, soft & non-tender    Extremities: No pedal edema. No clubbing or cyanosis. Musculoskeletal:  Motor normal; Gait: Normal.       There are no recent labs for review. IMPRESSION: Primary/Secondary Sleep Diagnoses (to Medical or Psych conditions) & Comorbidities   1. Snoring  Diagnostic Sleep Study      2. Witnessed episode of apnea  Diagnostic Sleep Study      3. RLS (restless legs syndrome)  Diagnostic Sleep Study      4.  Excessive daytime sleepiness        5. Deviated nasal septum  Diagnostic Sleep Study      6. Morbid obesity ([address])             PLAN:   1. With respect to above conditions, comprehensive counseling provided on pathophysiology; effects on symptoms and comorbidities, diagnostic strategies & limitations; treatment options; risks or no treament; risks & benefits of the various therapeutic options; costs and insurance aspects. 2. I advised weight reduction, avoiding sleeping supine, using alcohol or sedating medications close to bed time and on safe driving practices. 3. Sleep testing is indicated and a diagnostic study will be scheduled. Home sleep testing is contraindicated because Severe insomnia, RLS, High risk for sleep-related hypoventilation, and suspect upper airway resistance that would not be demonstrated by Home sleep testing   4. Patient is unwilling to try Positive airway pressure therapy. 5.  Nasal symptoms may improve with regular nasal saline rinse up to twice a day, followed by topical nasal steroid (e..g. OTC Flonase, Nasacort) once a day if necessary. 6. Follow-up to be scheduled after testing initiation of therapy to review results, further details of treatment options and to adjust therapy. Sincerely,      Authenticated electronically on 83/56/28   Board Certified Specialist     Portions of the record may have been created with voice recognition software. Occasional wrong word or "sound a like" substitutions may have occurred due to the inherent limitations of voice recognition software. There may also be notations and random deletions of words or characters from malfunctioning software. Read the chart carefully and recognize, using context, where substitutions/deletions have occurred.

## 2023-11-02 DIAGNOSIS — J06.9 BACTERIAL URI: ICD-10-CM

## 2023-11-02 DIAGNOSIS — B96.89 BACTERIAL URI: ICD-10-CM

## 2023-11-02 RX ORDER — DEXTROMETHORPHAN HYDROBROMIDE AND PROMETHAZINE HYDROCHLORIDE 15; 6.25 MG/5ML; MG/5ML
5 SYRUP ORAL 4 TIMES DAILY PRN
Qty: 240 ML | Refills: 0 | Status: SHIPPED | OUTPATIENT
Start: 2023-11-02

## 2023-11-27 ENCOUNTER — OFFICE VISIT (OUTPATIENT)
Dept: FAMILY MEDICINE CLINIC | Facility: CLINIC | Age: 63
End: 2023-11-27
Payer: COMMERCIAL

## 2023-11-27 VITALS
HEART RATE: 73 BPM | OXYGEN SATURATION: 99 % | RESPIRATION RATE: 16 BRPM | BODY MASS INDEX: 43.23 KG/M2 | TEMPERATURE: 98.5 F | WEIGHT: 269 LBS | DIASTOLIC BLOOD PRESSURE: 80 MMHG | SYSTOLIC BLOOD PRESSURE: 130 MMHG | HEIGHT: 66 IN

## 2023-11-27 DIAGNOSIS — J30.9 ALLERGIC SINUSITIS: ICD-10-CM

## 2023-11-27 DIAGNOSIS — J30.2 SEASONAL ALLERGIC RHINITIS, UNSPECIFIED TRIGGER: Primary | ICD-10-CM

## 2023-11-27 PROCEDURE — 99213 OFFICE O/P EST LOW 20 MIN: CPT | Performed by: NURSE PRACTITIONER

## 2023-11-27 RX ORDER — FLUTICASONE PROPIONATE 50 MCG
1 SPRAY, SUSPENSION (ML) NASAL DAILY
Qty: 16 G | Refills: 2 | Status: SHIPPED | OUTPATIENT
Start: 2023-11-27

## 2023-11-27 RX ORDER — CETIRIZINE HYDROCHLORIDE 10 MG/1
10 TABLET ORAL DAILY
Qty: 90 TABLET | Refills: 1 | Status: SHIPPED | OUTPATIENT
Start: 2023-11-27

## 2023-11-27 RX ORDER — MONTELUKAST SODIUM 10 MG/1
10 TABLET ORAL
Qty: 90 TABLET | Refills: 1 | Status: SHIPPED | OUTPATIENT
Start: 2023-11-27

## 2023-11-27 RX ORDER — BROMPHENIRAMINE MALEATE, PSEUDOEPHEDRINE HYDROCHLORIDE, AND DEXTROMETHORPHAN HYDROBROMIDE 2; 30; 10 MG/5ML; MG/5ML; MG/5ML
5 SYRUP ORAL 4 TIMES DAILY PRN
Qty: 240 ML | Refills: 0 | Status: SHIPPED | OUTPATIENT
Start: 2023-11-27

## 2023-11-27 NOTE — PROGRESS NOTES
[facility]        NAME: Sheldon Lopez is a 58 y.o. female  : 1960    MRN: [id number]  DATE: 2023  TIME: 2:22 PM    Assessment and Plan   Seasonal allergic rhinitis, unspecified trigger [J30.2]  1. Seasonal allergic rhinitis, unspecified trigger  brompheniramine-pseudoephedrine-DM 30-2-10 MG/5ML syrup    montelukast (SINGULAIR) 10 mg tablet    cetirizine (ZyrTEC) 10 mg tablet    fluticasone (FLONASE) 50 mcg/act nasal spray      2. Allergic sinusitis              Patient Instructions     Patient Instructions   Begin zyrtec daily  Flonase daily   Singulair daily at bedtime  Bromfed as needed for cough         Chief Complaint     Chief Complaint   Patient presents with    Cold Like Symptoms     Pt states she was in about 2 months ago with a cold. States still on going since September. States sleeping good, but her breathing is labored. History of Present Illness       Patient is a 59 y/o female, presenting for congestion and cough    -Since end of September, cough and congestion. Not a long of coughing. Feels something in her throat she cannot get out. Noisey breathing. Denies fevers, N/V/D. May have improved, at least the coughing has improved, but the congestion and breathing have not improved since the beginning. Review of Systems   Review of Systems   Constitutional:  Negative for activity change, diaphoresis, fatigue and fever. HENT:  Positive for congestion and postnasal drip. Negative for facial swelling, hearing loss, rhinorrhea, sinus pressure, sinus pain, sneezing, sore throat and voice change. Eyes:  Negative for discharge and visual disturbance. Respiratory:  Positive for cough and wheezing. Negative for choking, chest tightness, shortness of breath and stridor. Cardiovascular:  Negative for chest pain, palpitations and leg swelling.    Gastrointestinal:  Negative for abdominal distention, abdominal pain, constipation, diarrhea, nausea and vomiting. Endocrine: Negative for polydipsia, polyphagia and polyuria. Genitourinary:  Negative for difficulty urinating, dysuria, frequency and urgency. Musculoskeletal:  Negative for arthralgias, back pain, gait problem, joint swelling, myalgias, neck pain and neck stiffness. Skin:  Negative for color change, rash and wound. Neurological:  Negative for dizziness, syncope, speech difficulty, weakness, light-headedness and headaches. Hematological:  Negative for adenopathy. Does not bruise/bleed easily. Psychiatric/Behavioral:  Negative for agitation, behavioral problems, confusion, hallucinations, sleep disturbance and suicidal ideas. The patient is not nervous/anxious. All other systems reviewed and are negative.         Current Medications       Current Outpatient Medications:     brompheniramine-pseudoephedrine-DM 30-2-10 MG/5ML syrup, Take 5 mL by mouth 4 (four) times a day as needed for allergies, Disp: 240 mL, Rfl: 0    cetirizine (ZyrTEC) 10 mg tablet, Take 1 tablet (10 mg total) by mouth daily, Disp: 90 tablet, Rfl: 1    fluticasone (FLONASE) 50 mcg/act nasal spray, 1 spray into each nostril daily, Disp: 16 g, Rfl: 2    meloxicam (MOBIC) 15 mg tablet, Take 15 mg by mouth daily, Disp: , Rfl:     montelukast (SINGULAIR) 10 mg tablet, Take 1 tablet (10 mg total) by mouth daily at bedtime, Disp: 90 tablet, Rfl: 1    promethazine-dextromethorphan (PHENERGAN-DM) 6.25-15 mg/5 mL oral syrup, Take 5 mL by mouth 4 (four) times a day as needed for cough, Disp: 240 mL, Rfl: 0    promethazine-dextromethorphan (PHENERGAN-DM) 6.25-15 mg/5 mL oral syrup, Take 5 mL by mouth 4 (four) times a day as needed for cough, Disp: 240 mL, Rfl: 0    triamcinolone (KENALOG) 0.5 % cream, Apply topically 3 (three) times a day, Disp: 15 g, Rfl: 1    Current Allergies     Allergies as of 11/27/2023 - Reviewed 11/27/2023   Allergen Reaction Noted    Cat hair extract Other (See Comments) 12/04/2015            The following portions of the patient's history were reviewed and updated as appropriate: allergies, current medications, past family history, past medical history, past social history, past surgical history and problem list.     Past Medical History:   Diagnosis Date    Dizziness     Palpitations     SOB (shortness of breath)     Tachycardia        Past Surgical History:   Procedure Laterality Date    BUNIONECTOMY Right     TONSILLECTOMY         Family History   Problem Relation Age of Onset    No Known Problems Mother     No Known Problems Father     No Known Problems Sister     No Known Problems Daughter     No Known Problems Daughter     No Known Problems Maternal Grandmother     No Known Problems Maternal Grandfather     No Known Problems Paternal Grandmother     No Known Problems Paternal Grandfather     No Known Problems Son     No Known Problems Maternal Aunt     No Known Problems Maternal Aunt     No Known Problems Maternal Aunt     No Known Problems Paternal Aunt          Medications have been verified. Objective   /80   Pulse 73   Temp 98.5 °F (36.9 °C) (Tympanic)   Resp 16   Ht 5' 6" (1.676 m)   Wt 122 kg (269 lb)   SpO2 99%   BMI 43.42 kg/m²        Physical Exam     Physical Exam  Vitals and nursing note reviewed. Constitutional:       General: She is not in acute distress. Appearance: She is well-developed. She is not diaphoretic. HENT:      Right Ear: Tympanic membrane, ear canal and external ear normal.      Left Ear: Tympanic membrane, ear canal and external ear normal.      Nose: Congestion and rhinorrhea present. Mouth/Throat:      Mouth: Mucous membranes are moist.      Pharynx: Oropharynx is clear. Eyes:      Conjunctiva/sclera: Conjunctivae normal.   Neck:      Thyroid: No thyromegaly. Trachea: No tracheal deviation. Cardiovascular:      Rate and Rhythm: Normal rate and regular rhythm. Heart sounds: Normal heart sounds.    Pulmonary:      Effort: Pulmonary effort is normal. No respiratory distress. Breath sounds: Normal breath sounds. No wheezing. Musculoskeletal:         General: No tenderness or deformity. Normal range of motion. Cervical back: Normal range of motion and neck supple. Skin:     General: Skin is warm and dry. Findings: No erythema or rash. Neurological:      Mental Status: She is alert and oriented to person, place, and time. Psychiatric:         Mood and Affect: Mood normal.         Behavior: Behavior normal. Behavior is cooperative. Thought Content:  Thought content normal.         Judgment: Judgment normal.

## 2023-12-08 DIAGNOSIS — B96.89 BACTERIAL URI: Primary | ICD-10-CM

## 2023-12-08 DIAGNOSIS — J06.9 BACTERIAL URI: Primary | ICD-10-CM

## 2023-12-08 RX ORDER — AZITHROMYCIN 250 MG/1
TABLET, FILM COATED ORAL
Qty: 6 TABLET | Refills: 0 | Status: SHIPPED | OUTPATIENT
Start: 2023-12-08 | End: 2023-12-13

## 2023-12-14 DIAGNOSIS — J06.9 BACTERIAL URI: Primary | ICD-10-CM

## 2023-12-14 DIAGNOSIS — B96.89 BACTERIAL URI: Primary | ICD-10-CM

## 2023-12-14 RX ORDER — BENZONATATE 200 MG/1
200 CAPSULE ORAL 3 TIMES DAILY PRN
Qty: 30 CAPSULE | Refills: 1 | Status: SHIPPED | OUTPATIENT
Start: 2023-12-14

## 2023-12-18 DIAGNOSIS — J30.2 SEASONAL ALLERGIC RHINITIS, UNSPECIFIED TRIGGER: ICD-10-CM

## 2023-12-18 DIAGNOSIS — J30.9 ALLERGIC SINUSITIS: Primary | ICD-10-CM

## 2024-01-29 ENCOUNTER — APPOINTMENT (OUTPATIENT)
Dept: LAB | Facility: CLINIC | Age: 64
End: 2024-01-29
Payer: COMMERCIAL

## 2024-01-29 DIAGNOSIS — Z13.220 SCREENING CHOLESTEROL LEVEL: ICD-10-CM

## 2024-01-29 DIAGNOSIS — Z00.00 ANNUAL PHYSICAL EXAM: ICD-10-CM

## 2024-01-29 DIAGNOSIS — Z11.59 ENCOUNTER FOR HEPATITIS C SCREENING TEST FOR LOW RISK PATIENT: ICD-10-CM

## 2024-01-29 LAB
ALBUMIN SERPL BCP-MCNC: 3.8 G/DL (ref 3.5–5)
ALP SERPL-CCNC: 75 U/L (ref 34–104)
ALT SERPL W P-5'-P-CCNC: 13 U/L (ref 7–52)
ANION GAP SERPL CALCULATED.3IONS-SCNC: 7 MMOL/L
AST SERPL W P-5'-P-CCNC: 13 U/L (ref 13–39)
BASOPHILS # BLD AUTO: 0.06 THOUSANDS/ÂΜL (ref 0–0.1)
BASOPHILS NFR BLD AUTO: 1 % (ref 0–1)
BILIRUB SERPL-MCNC: 0.52 MG/DL (ref 0.2–1)
BUN SERPL-MCNC: 26 MG/DL (ref 5–25)
CALCIUM SERPL-MCNC: 9.1 MG/DL (ref 8.4–10.2)
CHLORIDE SERPL-SCNC: 104 MMOL/L (ref 96–108)
CHOLEST SERPL-MCNC: 150 MG/DL
CO2 SERPL-SCNC: 26 MMOL/L (ref 21–32)
CREAT SERPL-MCNC: 0.59 MG/DL (ref 0.6–1.3)
EOSINOPHIL # BLD AUTO: 0.43 THOUSAND/ÂΜL (ref 0–0.61)
EOSINOPHIL NFR BLD AUTO: 5 % (ref 0–6)
ERYTHROCYTE [DISTWIDTH] IN BLOOD BY AUTOMATED COUNT: 13.2 % (ref 11.6–15.1)
GFR SERPL CREATININE-BSD FRML MDRD: 96 ML/MIN/1.73SQ M
GLUCOSE P FAST SERPL-MCNC: 99 MG/DL (ref 65–99)
HCT VFR BLD AUTO: 46.1 % (ref 34.8–46.1)
HCV AB SER QL: NORMAL
HDLC SERPL-MCNC: 44 MG/DL
HGB BLD-MCNC: 14.8 G/DL (ref 11.5–15.4)
IMM GRANULOCYTES # BLD AUTO: 0.02 THOUSAND/UL (ref 0–0.2)
IMM GRANULOCYTES NFR BLD AUTO: 0 % (ref 0–2)
LDLC SERPL CALC-MCNC: 88 MG/DL (ref 0–100)
LYMPHOCYTES # BLD AUTO: 2.34 THOUSANDS/ÂΜL (ref 0.6–4.47)
LYMPHOCYTES NFR BLD AUTO: 25 % (ref 14–44)
MCH RBC QN AUTO: 28.6 PG (ref 26.8–34.3)
MCHC RBC AUTO-ENTMCNC: 32.1 G/DL (ref 31.4–37.4)
MCV RBC AUTO: 89 FL (ref 82–98)
MONOCYTES # BLD AUTO: 1.08 THOUSAND/ÂΜL (ref 0.17–1.22)
MONOCYTES NFR BLD AUTO: 12 % (ref 4–12)
NEUTROPHILS # BLD AUTO: 5.28 THOUSANDS/ÂΜL (ref 1.85–7.62)
NEUTS SEG NFR BLD AUTO: 57 % (ref 43–75)
NONHDLC SERPL-MCNC: 106 MG/DL
NRBC BLD AUTO-RTO: 0 /100 WBCS
PLATELET # BLD AUTO: 283 THOUSANDS/UL (ref 149–390)
PMV BLD AUTO: 9.7 FL (ref 8.9–12.7)
POTASSIUM SERPL-SCNC: 4 MMOL/L (ref 3.5–5.3)
PROT SERPL-MCNC: 7.1 G/DL (ref 6.4–8.4)
RBC # BLD AUTO: 5.17 MILLION/UL (ref 3.81–5.12)
SODIUM SERPL-SCNC: 137 MMOL/L (ref 135–147)
TRIGL SERPL-MCNC: 90 MG/DL
WBC # BLD AUTO: 9.21 THOUSAND/UL (ref 4.31–10.16)

## 2024-01-29 PROCEDURE — 80053 COMPREHEN METABOLIC PANEL: CPT

## 2024-01-29 PROCEDURE — 86803 HEPATITIS C AB TEST: CPT

## 2024-01-29 PROCEDURE — 36415 COLL VENOUS BLD VENIPUNCTURE: CPT

## 2024-01-29 PROCEDURE — 80061 LIPID PANEL: CPT

## 2024-01-29 PROCEDURE — 85025 COMPLETE CBC W/AUTO DIFF WBC: CPT

## 2024-01-30 ENCOUNTER — APPOINTMENT (OUTPATIENT)
Dept: RADIOLOGY | Facility: CLINIC | Age: 64
End: 2024-01-30
Payer: COMMERCIAL

## 2024-01-30 ENCOUNTER — OFFICE VISIT (OUTPATIENT)
Dept: FAMILY MEDICINE CLINIC | Facility: CLINIC | Age: 64
End: 2024-01-30
Payer: COMMERCIAL

## 2024-01-30 VITALS
TEMPERATURE: 97.6 F | HEART RATE: 82 BPM | DIASTOLIC BLOOD PRESSURE: 86 MMHG | HEIGHT: 66 IN | OXYGEN SATURATION: 96 % | SYSTOLIC BLOOD PRESSURE: 132 MMHG | BODY MASS INDEX: 42.59 KG/M2 | WEIGHT: 265 LBS

## 2024-01-30 DIAGNOSIS — Z00.00 ANNUAL PHYSICAL EXAM: Primary | ICD-10-CM

## 2024-01-30 DIAGNOSIS — Z13.220 SCREENING CHOLESTEROL LEVEL: ICD-10-CM

## 2024-01-30 DIAGNOSIS — Z28.21 PNEUMOCOCCAL VACCINATION DECLINED: ICD-10-CM

## 2024-01-30 DIAGNOSIS — G89.29 CHRONIC PAIN OF LEFT KNEE: ICD-10-CM

## 2024-01-30 DIAGNOSIS — B96.89 BACTERIAL URI: ICD-10-CM

## 2024-01-30 DIAGNOSIS — R53.83 OTHER FATIGUE: ICD-10-CM

## 2024-01-30 DIAGNOSIS — J06.9 BACTERIAL URI: ICD-10-CM

## 2024-01-30 DIAGNOSIS — M19.90 ARTHRITIS: ICD-10-CM

## 2024-01-30 DIAGNOSIS — Z13.29 SCREENING FOR THYROID DISORDER: ICD-10-CM

## 2024-01-30 DIAGNOSIS — M25.562 CHRONIC PAIN OF LEFT KNEE: ICD-10-CM

## 2024-01-30 DIAGNOSIS — Z13.820 SCREENING FOR OSTEOPOROSIS: ICD-10-CM

## 2024-01-30 PROCEDURE — 73562 X-RAY EXAM OF KNEE 3: CPT

## 2024-01-30 PROCEDURE — 99397 PER PM REEVAL EST PAT 65+ YR: CPT | Performed by: NURSE PRACTITIONER

## 2024-01-30 PROCEDURE — 99213 OFFICE O/P EST LOW 20 MIN: CPT | Performed by: NURSE PRACTITIONER

## 2024-01-30 RX ORDER — MELOXICAM 15 MG/1
15 TABLET ORAL DAILY
Qty: 90 TABLET | Refills: 3 | Status: SHIPPED | OUTPATIENT
Start: 2024-01-30

## 2024-01-30 RX ORDER — DEXTROMETHORPHAN HYDROBROMIDE AND PROMETHAZINE HYDROCHLORIDE 15; 6.25 MG/5ML; MG/5ML
5 SYRUP ORAL 4 TIMES DAILY PRN
Qty: 240 ML | Refills: 1 | Status: SHIPPED | OUTPATIENT
Start: 2024-01-30

## 2024-01-30 NOTE — PROGRESS NOTES
HPI:Patient is a 63 y/o female, presenting for annual exam.     - hx pain in right knee. Prescribed Mobic. Fell last summer and hyperextended this. Has intermittent knee pain with ambulation. Wants to know if she did any serious damage to this. Would like an xray for this.     - Patient reports some fatigue. Believes it is due to the winter months and bad weather. Interested in checking TSH and vitamin D levels with next set of blood work.     - Lab work reviewed. All questions answered.     - Needs a refill of Mobic- patient taking 15mg daily. Refill sent.   Patient also requesting refill of Promethazine cough medicine for continued coughing. Refill sent.         Makayla Ruano is a 64 y.o. female here for her yearly health maintenance exam.   Patient Active Problem List   Diagnosis    Pneumococcal vaccination declined     Past Medical History:   Diagnosis Date    Allergic     [facility]    Arthritis 2022    X-ray    Dizziness     Kidney stone 2005    Obesity 2002    Palpitations     SOB (shortness of breath)     Tachycardia        1. Advanced Directive: na     2. Durable Power of  for Healthcare: na       PHQ-2/9 Depression Screening    Little interest or pleasure in doing things: 0 - not at all  Feeling down, depressed, or hopeless: 0 - not at all  PHQ-2 Score: 0  PHQ-2 Interpretation: Negative depression screen           Current Outpatient Medications   Medication Sig Dispense Refill    meloxicam (MOBIC) 15 mg tablet Take 1 tablet (15 mg total) by mouth daily 90 tablet 3    promethazine-dextromethorphan (PHENERGAN-DM) 6.25-15 mg/5 mL oral syrup Take 5 mL by mouth 4 (four) times a day as needed for cough 240 mL 1     No current facility-administered medications for this visit.     Allergies   Allergen Reactions    Cat Hair Extract Other (See Comments)     Immunization History   Administered Date(s) Administered    COVID-19 MODERNA VACC 0.5 ML IM 04/20/2021, 05/18/2021    Tdap 01/30/2023       Patient Care  Team:  ALICIA Benson as PCP - General (Family Medicine)    Review of Systems   Constitutional:  Negative for activity change, diaphoresis, fatigue and fever.   HENT:  Positive for congestion, postnasal drip and rhinorrhea. Negative for facial swelling, hearing loss, sinus pressure, sinus pain, sneezing, sore throat and voice change.    Eyes:  Negative for discharge and visual disturbance.   Respiratory:  Positive for cough. Negative for choking, chest tightness, shortness of breath, wheezing and stridor.    Cardiovascular:  Negative for chest pain, palpitations and leg swelling.   Gastrointestinal:  Negative for abdominal distention, abdominal pain, constipation, diarrhea, nausea and vomiting.   Endocrine: Negative for polydipsia, polyphagia and polyuria.   Genitourinary:  Negative for difficulty urinating, dysuria, frequency and urgency.   Musculoskeletal:  Positive for arthralgias and myalgias. Negative for back pain, gait problem, joint swelling, neck pain and neck stiffness.        Left knee     Skin:  Negative for color change, rash and wound.   Neurological:  Negative for dizziness, syncope, speech difficulty, weakness, light-headedness and headaches.   Hematological:  Negative for adenopathy. Does not bruise/bleed easily.   Psychiatric/Behavioral:  Negative for agitation, behavioral problems, confusion, hallucinations, sleep disturbance and suicidal ideas. The patient is not nervous/anxious.    All other systems reviewed and are negative.        Physical Exam :  Physical Exam  Vitals and nursing note reviewed.   Constitutional:       General: She is not in acute distress.     Appearance: She is well-developed. She is not diaphoretic.   HENT:      Head: Normocephalic.      Right Ear: Tympanic membrane, ear canal and external ear normal.      Left Ear: Tympanic membrane, ear canal and external ear normal.      Nose: Congestion present.      Mouth/Throat:      Mouth: Mucous membranes are moist.       Pharynx: Oropharynx is clear.   Neck:      Thyroid: No thyromegaly.      Trachea: No tracheal deviation.   Cardiovascular:      Rate and Rhythm: Normal rate and regular rhythm.      Heart sounds: Normal heart sounds. No murmur heard.  Pulmonary:      Effort: Pulmonary effort is normal. No respiratory distress.      Breath sounds: Normal breath sounds. No wheezing.   Musculoskeletal:         General: No deformity. Normal range of motion.      Cervical back: Normal range of motion and neck supple.      Left knee: Tenderness present.      Comments: Intermittent left knee tenderness    Skin:     General: Skin is warm and dry.      Findings: No erythema or rash.   Neurological:      Mental Status: She is alert and oriented to person, place, and time.   Psychiatric:         Mood and Affect: Mood normal.         Behavior: Behavior normal. Behavior is cooperative.         Thought Content: Thought content normal.         Judgment: Judgment normal.           Reviewed Updated [facility] Prior Wellness Visits:   Last Health Maintenance visit information was reviewed, patient interviewed , no change since last HM visit no  Last  visit information was reviewed, patient interviewed and updates made to the record today yes    Assessment and Plan:  1. Annual physical exam  CBC and differential    Comprehensive metabolic panel      2. Screening for osteoporosis  DXA bone density spine hip and pelvis      3. Screening cholesterol level  Lipid panel      4. Arthritis  meloxicam (MOBIC) 15 mg tablet      5. Screening for thyroid disorder  TSH, 3rd generation with Free T4 reflex      6. Chronic pain of left knee  XR knee 3 vw left non injury      7. Other fatigue  Vitamin D 25 hydroxy      8. Bacterial URI  promethazine-dextromethorphan (PHENERGAN-DM) 6.25-15 mg/5 mL oral syrup      9. Pneumococcal vaccination declined            Health Maintenance Due   Topic Date Due    HIV Screening  Never done    Osteoporosis Screening  Never done     Zoster Vaccine (1 of 2) Never done    Influenza Vaccine (1) Never done    COVID-19 Vaccine (3 - 2023-24 season) 09/01/2023    Fall Risk  Never done    Urinary Incontinence Screening  Never done    Pneumococcal Vaccine: 65+ Years (1 - PCV) Never done    Breast Cancer Screening: Mammogram  02/01/2024

## 2024-01-30 NOTE — PATIENT INSTRUCTIONS
Promethazine cough medicine as needed at bedtime   Mobic refill sent   Xray of left knee Lab   Lab work prior to next visit   Return with issues/concerns

## 2024-04-05 DIAGNOSIS — B35.1 TOENAIL FUNGUS: Primary | ICD-10-CM

## 2024-04-05 RX ORDER — CICLOPIROX 80 MG/ML
SOLUTION TOPICAL
Qty: 6 ML | Refills: 11 | Status: SHIPPED | OUTPATIENT
Start: 2024-04-05

## 2024-05-17 ENCOUNTER — HOSPITAL ENCOUNTER (OUTPATIENT)
Dept: SLEEP CENTER | Facility: CLINIC | Age: 64
Discharge: HOME | End: 2024-05-17
Payer: COMMERCIAL

## 2024-05-17 DIAGNOSIS — G25.81 RLS (RESTLESS LEGS SYNDROME): ICD-10-CM

## 2024-05-17 DIAGNOSIS — R06.83 SNORING: ICD-10-CM

## 2024-05-17 DIAGNOSIS — R06.81 WITNESSED EPISODE OF APNEA: ICD-10-CM

## 2024-05-17 DIAGNOSIS — J34.2 DEVIATED NASAL SEPTUM: ICD-10-CM

## 2024-05-17 PROCEDURE — 95810 POLYSOM 6/> YRS 4/> PARAM: CPT

## 2024-05-18 PROBLEM — G47.33 OSA (OBSTRUCTIVE SLEEP APNEA): Status: ACTIVE | Noted: 2024-05-18

## 2024-05-18 NOTE — PROGRESS NOTES
Sleep Study Documentation    Pre-Sleep Study       Sleep testing procedure explained to patient:YES    Patient napped prior to study:NO    Caffeine:Dayshift worker after 12PM.  Caffeine use:NO    Alcohol:Dayshift workers after 5PM: Alcohol use:NO    Typical day for patient:YES       Study Documentation    Sleep Study Indications: BMI >30, EDS, RLS    Sleep Study: Diagnostic   Snore:Moderate  Supplemental O2: no      Minimum SaO2 87%  Baseline SaO2 94%    EKG abnormalities: no     EEG abnormalities: no    Were abnormal behaviors in sleep observed:NO    Is Total Sleep Study Recording Time < 2 hours: N/A    Is Total Sleep Study Recording Time > 2 hours but study is incomplete: N/A    Is Total Sleep Study Recording Time 6 hours or more but sleep was not obtained: NO    Patient classification: employed       Post-Sleep Study    Medication used at bedtime or during sleep study:NO    Patient reports time it took to fall asleep:less than 20 minutes    Patient reports waking up during study:1 to 2 times.  Patient reports returning to sleep in 10 to 30 minutes.    Patient reports sleeping 4 to 6 hours without dreaming.    Does the Patient feel this is a typical night of sleep:worse than usual    Patient rated sleepiness: Somewhat sleepy or tired    PAP treatment:no.

## 2024-06-13 ENCOUNTER — TELEPHONE (OUTPATIENT)
Dept: SLEEP CENTER | Facility: CLINIC | Age: 64
End: 2024-06-13

## 2024-06-13 ENCOUNTER — HOSPITAL ENCOUNTER (EMERGENCY)
Facility: HOSPITAL | Age: 64
Discharge: HOME | End: 2024-06-13
Attending: EMERGENCY MEDICINE
Payer: COMMERCIAL

## 2024-06-13 ENCOUNTER — APPOINTMENT (EMERGENCY)
Dept: RADIOLOGY | Facility: HOSPITAL | Age: 64
End: 2024-06-13
Payer: COMMERCIAL

## 2024-06-13 VITALS
BODY MASS INDEX: 41.78 KG/M2 | HEIGHT: 66 IN | HEART RATE: 65 BPM | SYSTOLIC BLOOD PRESSURE: 190 MMHG | WEIGHT: 260 LBS | TEMPERATURE: 98.6 F | DIASTOLIC BLOOD PRESSURE: 88 MMHG | OXYGEN SATURATION: 98 % | RESPIRATION RATE: 18 BRPM

## 2024-06-13 DIAGNOSIS — M25.561 RIGHT KNEE PAIN: Primary | ICD-10-CM

## 2024-06-13 PROCEDURE — 73564 X-RAY EXAM KNEE 4 OR MORE: CPT

## 2024-06-13 PROCEDURE — 99284 EMERGENCY DEPT VISIT MOD MDM: CPT

## 2024-06-13 PROCEDURE — 99283 EMERGENCY DEPT VISIT LOW MDM: CPT

## 2024-06-13 NOTE — DISCHARGE INSTRUCTIONS
Immediately return to the emergency room if you experience any new or worsening symptoms or if the symptoms are lasting longer than expected.     Please continue with RICE (rest, ice, compression, and elevation) principal at home. Please follow-up with orthopedic surgery to discuss your right knee pain. Try to remain nonweightbearing until then. Take ibuprofen or Tylenol for your right knee pain. Dosing instructions are attached.    Please take ibuprofen (Motrin) or acetaminophen (Tylenol) as needed for pain. These are available over-the-counter. You may take ibuprofen 600 mg every 8 hours with food for pain. You may also take acetaminophen 650 mg every 4-6 hours for pain. Do not exceed 3000 mg of Tylenol a day as this can cause liver damage. Do not drink alcohol with either of these medications. Evidence-based research has shown taking these 2 drugs together work the same (or better in some cases) for pain than opioids or narcotic pain medication.

## 2024-06-13 NOTE — ED PROVIDER NOTES
History  Chief Complaint   Patient presents with    Leg Pain     According to the patient, her grandson was sitting on her and she felt pain in upper leg.  Last night her right knee gave out.     Patient is a 64-year-old female with relevant past medical history of arthritis, dizziness, kidney stone, obesity, palpitations, and SOB presenting with right knee pain x 2 days. Her 6-year-old grandson was sitting on her right knee on Tuesday which caused some discomfort in her right knee. She felt pressure behind her right knee yesterday and then her right knee gave out yesterday while walking to the car. She reports it was very painful and she has been having difficulty walking on it. She is now favoring her left leg. She made a delivery this morning and drove her vehicle and has been getting around with a crutch secondary to the right knee pain. She complains of 2/10 achy pain behind her right knee which is exacerbated with movement. The pain radiates up her right leg and also down her right leg. She last took Motrin 400 mg an hour prior to arrival. She has crutches at home. She has no other complaints. She has never had a knee issue or surgery in the past but has a history of arthritis. Patient denies fever, chills, headache, dizziness, numbness, tingling, weakness, visual changes, chest pain, SOB, abdominal pain, nausea, or vomiting.           History provided by:  Patient   used: No    Leg Pain  Associated symptoms: no back pain and no fever        Prior to Admission Medications   Prescriptions Last Dose Informant Patient Reported? Taking?   ciclopirox (PENLAC) 8 % solution   No No   Sig: Apply topically daily at bedtime   meloxicam (MOBIC) 15 mg tablet   No No   Sig: Take 1 tablet (15 mg total) by mouth daily   promethazine-dextromethorphan (PHENERGAN-DM) 6.25-15 mg/5 mL oral syrup   No No   Sig: Take 5 mL by mouth 4 (four) times a day as needed for cough      Facility-Administered Medications:  None       Past Medical History:   Diagnosis Date    Allergic     [facility]    Arthritis     X-ray    Dizziness     Kidney stone 2005    Obesity 2002    Palpitations     SOB (shortness of breath)     Tachycardia        Past Surgical History:   Procedure Laterality Date    BUNIONECTOMY Right     TONSILLECTOMY         Family History   Problem Relation Age of Onset    No Known Problems Mother     No Known Problems Father     No Known Problems Sister     No Known Problems Daughter     No Known Problems Daughter     No Known Problems Maternal Grandmother     No Known Problems Maternal Grandfather     No Known Problems Paternal Grandmother     No Known Problems Paternal Grandfather     No Known Problems Son     No Known Problems Maternal Aunt     No Known Problems Maternal Aunt     No Known Problems Maternal Aunt     No Known Problems Paternal Aunt      I have reviewed and agree with the history as documented.    E-Cigarette/Vaping    E-Cigarette Use Never User      E-Cigarette/Vaping Substances    Nicotine No     THC No     CBD No     Flavoring No     Other No     Unknown No      Social History     Tobacco Use    Smoking status: Former     Current packs/day: 0.00     Average packs/day: 2.0 packs/day for 5.0 years (10.0 ttl pk-yrs)     Types: Cigarettes     Start date: 1977     Quit date:      Years since quittin.4    Smokeless tobacco: Never   Vaping Use    Vaping status: Never Used   Substance Use Topics    Alcohol use: Yes     Alcohol/week: 2.0 standard drinks of alcohol     Types: 2 Standard drinks or equivalent per week     Comment: moderate    Drug use: Never       Review of Systems   Constitutional:  Negative for chills and fever.   HENT:  Negative for congestion, ear pain, rhinorrhea and sore throat.    Eyes:  Negative for pain and visual disturbance.   Respiratory:  Negative for cough and shortness of breath.    Cardiovascular:  Negative for chest pain and palpitations.   Gastrointestinal:   Negative for abdominal pain, constipation, diarrhea, nausea and vomiting.   Genitourinary:  Negative for dysuria, frequency, hematuria and urgency.   Musculoskeletal:  Negative for arthralgias and back pain.        R knee pain.    Skin:  Negative for color change and rash.   Neurological:  Negative for dizziness, seizures, syncope, light-headedness and headaches.   Psychiatric/Behavioral:  Negative for agitation and confusion.        Physical Exam  Physical Exam  Vitals and nursing note reviewed.   Constitutional:       General: She is not in acute distress.     Appearance: She is well-developed. She is obese. She is not ill-appearing.   HENT:      Head: Normocephalic and atraumatic.   Eyes:      Conjunctiva/sclera: Conjunctivae normal.   Cardiovascular:      Rate and Rhythm: Normal rate and regular rhythm.      Heart sounds: No murmur heard.  Pulmonary:      Effort: Pulmonary effort is normal. No respiratory distress.      Breath sounds: Normal breath sounds. No wheezing, rhonchi or rales.   Abdominal:      Palpations: Abdomen is soft.      Tenderness: There is no abdominal tenderness. There is no guarding or rebound.   Musculoskeletal:         General: No swelling.      Cervical back: Neck supple.      Right upper leg: Normal.      Right knee: Swelling present. No deformity, erythema, ecchymosis, bony tenderness or crepitus. Normal range of motion. No tenderness.      Instability Tests: Anterior drawer test negative. Posterior drawer test negative.      Left knee: Normal.      Right lower leg: Normal.      Comments: Mild swelling to the patient's right knee. No tenderness, erythema, warmth, or crepitus. Normal range of motion and alignment. Difficulty weightbearing on this knee.   Skin:     General: Skin is warm and dry.      Capillary Refill: Capillary refill takes less than 2 seconds.   Neurological:      General: No focal deficit present.      Mental Status: She is alert and oriented to person, place, and time.    Psychiatric:         Mood and Affect: Mood normal.         Vital Signs  ED Triage Vitals [06/13/24 0938]   Temperature Pulse Respirations Blood Pressure SpO2   98.6 °F (37 °C) 65 18 (!) 190/88 98 %      Temp Source Heart Rate Source Patient Position - Orthostatic VS BP Location FiO2 (%)   Oral Monitor Sitting Right arm --      Pain Score       9           Vitals:    06/13/24 0938   BP: (!) 190/88   Pulse: 65   Patient Position - Orthostatic VS: Sitting         Visual Acuity      ED Medications  Medications - No data to display    Diagnostic Studies  Results Reviewed       None                   XR knee 4+ vw right injury   ED Interpretation by Wood Sosa PA-C (06/13 1057)   No acute osseous abnormality. Mild arthritis.                 Procedures  Procedures         ED Course  ED Course as of 06/13/24 1114   Thu Jun 13, 2024   0940 Vital signs reviewed. Elevated BP. Will repeat this.   1100 Went over results with patient. She is feeling well and ready to go home. Provided orthopedic surgery referral and offered crutches/knee immobilizer for home but she declined. Will discharge home with strict return precautions.                               SBIRT 20yo+      Flowsheet Row Most Recent Value   Initial Alcohol Screen: US AUDIT-C     1. How often do you have a drink containing alcohol? 0 Filed at: 06/13/2024 0942   2. How many drinks containing alcohol do you have on a typical day you are drinking?  0 Filed at: 06/13/2024 0942   3b. FEMALE Any Age, or MALE 65+: How often do you have 4 or more drinks on one occassion? 0 Filed at: 06/13/2024 0942   Audit-C Score 0 Filed at: 06/13/2024 0942   JAMIA: How many times in the past year have you...    Used an illegal drug or used a prescription medication for non-medical reasons? Never Filed at: 06/13/2024 0942                      Medical Decision Making  Patient is a 64-year-old female with relevant past medical history of arthritis presenting with right knee pain x  2 days. Mild swelling but no erythema, ecchymosis, crepitus, or warmth. No notable effusion.  Brief focused differential diagnosis including but not limited to: ligamentous injury, meniscus injury, knee dislocation, knee sprain, arthritis  Right knee x-ray to rule out fracture or dislocation. This was unremarkable other than for arthritis upon my preliminary interpretation.  Offered pain medications here in the emergency department but she declined as her right knee pain is minimal at rest.  Educated patient on RICE (rest, ice, compression, and elevation) principal and advised her to follow-up with orthopedic surgery. Provided ambulatory referral to orthopedic surgery. Offered crutches and knee immobilizer for home but she declined.  Dispo: Patient discharged home with strict return precautions. Provided verbal and written supportive care instructions for managing her illness. Advised patient to return to the nearest emergency room if she has new or worsening symptoms or if any questions arise. Advised patient to follow-up with her family doctor and orthopedic surgery. Patient is satisfied with care and agrees with management and plan.     Amount and/or Complexity of Data Reviewed  External Data Reviewed: labs, radiology and notes.  Radiology: ordered and independent interpretation performed.             Disposition  Final diagnoses:   Right knee pain     Time reflects when diagnosis was documented in both MDM as applicable and the Disposition within this note       Time User Action Codes Description Comment    6/13/2024 10:27 AM Wood Sosa Add [M25.561] Right knee pain           ED Disposition       ED Disposition   Discharge    Condition   Stable    Date/Time   u Jun 13, 2024 1027    Comment   Makayla Ruano discharge to home/self care.                   Follow-up Information       Follow up With Specialties Details Why Contact Info Additional Information    [facility] Emergency Department  Emergency Medicine Go to  If symptoms worsen [address] [place] [address] [phone] [facility] Emergency Department, [address], [place], [place] [address]    [facility] [place] [facility] Schedule an appointment as soon as possible for a visit   [facility] [address]  [facility] [address] [phone] [facility] [place] [address], [place], [place], [address], [phone]            Patient's Medications   Discharge Prescriptions    No medications on file           PDMP Review       None            ED Provider  Electronically Signed by             Wood Sosa PA-C  06/13/24 1264

## 2024-06-13 NOTE — TELEPHONE ENCOUNTER
Called patient and advised sleep study resulted and shows mild sleep apnea and significant hypoxia. Severe PLM.    Offered to schedule follow up with Dr. Loving to discuss treatment options but patient declined at this time.  States she is having some issues walking and was in the ED recently.  She will call back to schedule an appointment at another time.

## 2024-06-14 ENCOUNTER — OFFICE VISIT (OUTPATIENT)
Dept: OBGYN CLINIC | Facility: CLINIC | Age: 64
End: 2024-06-14
Payer: COMMERCIAL

## 2024-06-14 VITALS
HEIGHT: 66 IN | SYSTOLIC BLOOD PRESSURE: 158 MMHG | TEMPERATURE: 98.1 F | BODY MASS INDEX: 42.75 KG/M2 | DIASTOLIC BLOOD PRESSURE: 81 MMHG | WEIGHT: 266 LBS | HEART RATE: 68 BPM

## 2024-06-14 DIAGNOSIS — S89.91XA KNEE INJURY, RIGHT, INITIAL ENCOUNTER: Primary | ICD-10-CM

## 2024-06-14 DIAGNOSIS — M17.11 PRIMARY OSTEOARTHRITIS OF ONE KNEE, RIGHT: ICD-10-CM

## 2024-06-14 PROCEDURE — 99204 OFFICE O/P NEW MOD 45 MIN: CPT | Performed by: FAMILY MEDICINE

## 2024-06-14 RX ORDER — IBUPROFEN 200 MG
400 TABLET ORAL EVERY 6 HOURS PRN
COMMUNITY

## 2024-06-14 RX ORDER — ACETAMINOPHEN 500 MG
1000 TABLET ORAL EVERY 6 HOURS PRN
COMMUNITY

## 2024-06-14 NOTE — PROGRESS NOTES
[facility] [address] [address] [phone]      Assessment:  1. Knee injury, right, initial encounter  -     Ambulatory Referral to Orthopedic Surgery  -     Ambulatory Referral to Physical Therapy; Future  2. Primary osteoarthritis of one knee, right      Plan:  Patient Instructions   F/u as needed  Declined steroid injection at this time  Home exercises.  Icing/heat/OTC pain meds as needed.     Return if symptoms worsen or fail to improve.    Chief Complaint:  Chief Complaint   Patient presents with    Right Knee - Pain       Subjective:   HPI    Patient ID: Makayla Ruano is a 64 y.o. female     Here c/o R knee pain  Tuesday night on the floor and grandson sat on her R knee- caused pain- resolved  She got up Wed- felt stiffness in R knee  Walking on Wed and knee gave out  No swelling/locking/giving out  Having less pain currently  Getting better  Couldn't walk yesterday  Tylenol/motrin - PRN helping  Was sharp pain  Using walker    RIGHT LOWER EXTREMITY     INDICATION:   Felt R knee give out yest., R knee pain today.     COMPARISON:  None     VIEWS:  XR KNEE 4+ VW RIGHT INJURY  Images: 4     FINDINGS:     There is no acute fracture or dislocation.     Mild medial compartment arthritis  Mild patellofemoral arthritis     No lytic or blastic osseous lesion.     Soft tissues are unremarkable.     IMPRESSION:  No acute displaced fracture description         Review of Systems   Constitutional:  Negative for fatigue and fever.   Respiratory:  Negative for shortness of breath.    Cardiovascular:  Negative for chest pain.   Gastrointestinal:  Negative for abdominal pain and nausea.   Genitourinary:  Negative for dysuria.   Musculoskeletal:  Positive for arthralgias.   Skin:  Negative for rash and wound.   Neurological:  Negative for weakness and headaches.       Objective:  Vitals:  /81 (BP Location: Left arm, Patient Position:  "Sitting, Cuff Size: Standard)   Pulse 68   Temp 98.1 °F (36.7 °C) (Tympanic)   Ht 5' 6\" (1.676 m)   Wt 121 kg (266 lb)   BMI 42.93 kg/m²     The following portions of the patient's history were reviewed and updated as appropriate: allergies, current medications, past family history, past medical history, past social history, past surgical history, and problem list.    Physical exam:  Physical Exam  Constitutional:       Appearance: Normal appearance. She is normal weight.   HENT:      Head: Normocephalic.   Eyes:      Extraocular Movements: Extraocular movements intact.   Pulmonary:      Effort: Pulmonary effort is normal.   Musculoskeletal:         General: Tenderness present.      Cervical back: Normal range of motion.      Right knee: No effusion.      Instability Tests: Medial Uli test positive. Lateral Uli test negative.   Skin:     General: Skin is warm and dry.   Neurological:      General: No focal deficit present.      Mental Status: She is alert and oriented to person, place, and time. Mental status is at baseline.   Psychiatric:         Mood and Affect: Mood normal.         Behavior: Behavior normal.         Thought Content: Thought content normal.         Judgment: Judgment normal.       Right Knee Exam     Tenderness   The patient is experiencing no tenderness.     Range of Motion   The patient has normal right knee ROM.    Tests   Uli:  Medial - positive Lateral - negative  Varus: negative Valgus: negative    Other   Swelling: none  Effusion: no effusion present          Observations     Right Knee   Negative for effusion.   Procedures      I have personally reviewed pertinent films in PACS and my interpretation is XR  R knee- nml study. No fx. Mild OA      Charlie Pat MD   "

## 2024-06-14 NOTE — PATIENT INSTRUCTIONS
F/u as needed  Declined steroid injection at this time  Home exercises.  Icing/heat/OTC pain meds as needed.

## 2024-06-28 ENCOUNTER — OFFICE VISIT (OUTPATIENT)
Dept: OBGYN CLINIC | Facility: CLINIC | Age: 64
End: 2024-06-28
Payer: COMMERCIAL

## 2024-06-28 VITALS
BODY MASS INDEX: 43.07 KG/M2 | HEART RATE: 59 BPM | TEMPERATURE: 97.8 F | SYSTOLIC BLOOD PRESSURE: 168 MMHG | HEIGHT: 66 IN | WEIGHT: 268 LBS | DIASTOLIC BLOOD PRESSURE: 84 MMHG

## 2024-06-28 DIAGNOSIS — M17.11 PRIMARY OSTEOARTHRITIS OF ONE KNEE, RIGHT: Primary | ICD-10-CM

## 2024-06-28 DIAGNOSIS — S89.91XD RIGHT KNEE INJURY, SUBSEQUENT ENCOUNTER: ICD-10-CM

## 2024-06-28 PROCEDURE — 99214 OFFICE O/P EST MOD 30 MIN: CPT | Performed by: FAMILY MEDICINE

## 2024-06-28 PROCEDURE — 20610 DRAIN/INJ JOINT/BURSA W/O US: CPT | Performed by: FAMILY MEDICINE

## 2024-06-28 RX ORDER — TRIAMCINOLONE ACETONIDE 40 MG/ML
40 INJECTION, SUSPENSION INTRA-ARTICULAR; INTRAMUSCULAR
Status: COMPLETED | OUTPATIENT
Start: 2024-06-28 | End: 2024-06-28

## 2024-06-28 RX ORDER — ROPIVACAINE HYDROCHLORIDE 5 MG/ML
10 INJECTION, SOLUTION EPIDURAL; INFILTRATION; PERINEURAL
Status: COMPLETED | OUTPATIENT
Start: 2024-06-28 | End: 2024-06-28

## 2024-06-28 RX ADMIN — TRIAMCINOLONE ACETONIDE 40 MG: 40 INJECTION, SUSPENSION INTRA-ARTICULAR; INTRAMUSCULAR at 14:30

## 2024-06-28 RX ADMIN — ROPIVACAINE HYDROCHLORIDE 10 ML: 5 INJECTION, SOLUTION EPIDURAL; INFILTRATION; PERINEURAL at 14:30

## 2024-06-28 NOTE — PROGRESS NOTES
"[facility] [address] [address] [phone]      Assessment:  1. Primary osteoarthritis of one knee, right  2. Right knee injury, subsequent encounter      Plan:  Patient Instructions   F/u as needed  R knee steroid injection  Icing/heat/OTC pain meds as needed.     Return if symptoms worsen or fail to improve.    Chief Complaint:  Chief Complaint   Patient presents with    Right Knee - Follow-up       Subjective:   HPI    Patient ID: Makayla Ruano is a 64 y.o. female     Here c/o R knee pain  Having pain/pain walking  Swelling  No locking/giving out  Pain walking  Tylenol/motrin - PRN helping  Sharp pain    Review of Systems   Constitutional:  Negative for fatigue and fever.   Respiratory:  Negative for shortness of breath.    Cardiovascular:  Negative for chest pain.   Gastrointestinal:  Negative for abdominal pain and nausea.   Genitourinary:  Negative for dysuria.   Musculoskeletal:  Positive for arthralgias.   Skin:  Negative for rash and wound.   Neurological:  Negative for weakness and headaches.       Objective:  Vitals:  /84   Pulse 59   Temp 97.8 °F (36.6 °C) (Tympanic)   Ht 5' 6\" (1.676 m)   Wt 122 kg (268 lb)   BMI 43.26 kg/m²     The following portions of the patient's history were reviewed and updated as appropriate: allergies, current medications, past family history, past medical history, past social history, past surgical history, and problem list.    Physical exam:  Physical Exam  Constitutional:       Appearance: Normal appearance. She is normal weight.   HENT:      Head: Normocephalic.   Eyes:      Extraocular Movements: Extraocular movements intact.   Pulmonary:      Effort: Pulmonary effort is normal.   Musculoskeletal:         General: Tenderness present.      Cervical back: Normal range of motion.      Right knee: No effusion.      Instability Tests: Medial Uli test negative and lateral " "Uli test negative.   Skin:     General: Skin is warm and dry.   Neurological:      General: No focal deficit present.      Mental Status: She is alert and oriented to person, place, and time. Mental status is at baseline.   Psychiatric:         Mood and Affect: Mood normal.         Behavior: Behavior normal.         Thought Content: Thought content normal.         Judgment: Judgment normal.       Right Knee Exam     Tenderness   The patient is experiencing tenderness in the medial joint line.    Range of Motion   The patient has normal right knee ROM.    Tests   Uli:  Medial - negative Lateral - negative  Varus: negative Valgus: negative    Other   Swelling: none  Effusion: no effusion present          Observations     Right Knee   Negative for effusion.       Large joint arthrocentesis: R knee  Universal Protocol:  Consent: Verbal consent obtained.  Risks and benefits: risks, benefits and alternatives were discussed  Consent given by: patient  Time out: Immediately prior to procedure a \"time out\" was called to verify the correct patient, procedure, equipment, support staff and site/side marked as required.  Timeout called at: 6/28/2024 3:01 PM.  Site marked: the operative site was marked  Supporting Documentation  Indications: pain   Procedure Details  Location: knee - R knee  Preparation: Patient was prepped and draped in the usual sterile fashion  Needle size: 25 G  Ultrasound guidance: no  Approach: anterolateral  Medications administered: 40 mg triamcinolone acetonide 40 mg/mL; 10 mL ropivacaine 0.5 %    Patient tolerance: patient tolerated the procedure well with no immediate complications  Dressing:  Sterile dressing applied              Charlie Pat MD   "

## 2024-06-30 ENCOUNTER — HOSPITAL ENCOUNTER (EMERGENCY)
Facility: HOSPITAL | Age: 64
Discharge: HOME | End: 2024-06-30
Attending: EMERGENCY MEDICINE
Payer: COMMERCIAL

## 2024-06-30 ENCOUNTER — APPOINTMENT (EMERGENCY)
Dept: CT IMAGING | Facility: HOSPITAL | Age: 64
End: 2024-06-30
Payer: COMMERCIAL

## 2024-06-30 VITALS
TEMPERATURE: 98.6 F | DIASTOLIC BLOOD PRESSURE: 86 MMHG | SYSTOLIC BLOOD PRESSURE: 185 MMHG | BODY MASS INDEX: 42.75 KG/M2 | HEIGHT: 66 IN | HEART RATE: 63 BPM | OXYGEN SATURATION: 95 % | WEIGHT: 266 LBS | RESPIRATION RATE: 18 BRPM

## 2024-06-30 DIAGNOSIS — N20.1 LEFT URETERAL STONE: ICD-10-CM

## 2024-06-30 DIAGNOSIS — Z87.442 PERSONAL HISTORY OF KIDNEY STONES: ICD-10-CM

## 2024-06-30 DIAGNOSIS — R10.9 FLANK PAIN: Primary | ICD-10-CM

## 2024-06-30 LAB
ALBUMIN SERPL BCG-MCNC: 4.5 G/DL (ref 3.5–5)
ALP SERPL-CCNC: 80 U/L (ref 34–104)
ALT SERPL W P-5'-P-CCNC: 18 U/L (ref 7–52)
ANION GAP SERPL CALCULATED.3IONS-SCNC: 6 MMOL/L (ref 4–13)
AST SERPL W P-5'-P-CCNC: 17 U/L (ref 13–39)
BACTERIA UR QL AUTO: ABNORMAL /HPF
BASOPHILS # BLD AUTO: 0.04 THOUSANDS/ÂΜL (ref 0–0.1)
BASOPHILS NFR BLD AUTO: 0 % (ref 0–1)
BILIRUB SERPL-MCNC: 0.56 MG/DL (ref 0.2–1)
BILIRUB UR QL STRIP: NEGATIVE
BUN SERPL-MCNC: 22 MG/DL (ref 5–25)
CALCIUM SERPL-MCNC: 9 MG/DL (ref 8.4–10.2)
CHLORIDE SERPL-SCNC: 104 MMOL/L (ref 96–108)
CLARITY UR: CLEAR
CO2 SERPL-SCNC: 25 MMOL/L (ref 21–32)
COLOR UR: YELLOW
CREAT SERPL-MCNC: 0.65 MG/DL (ref 0.6–1.3)
EOSINOPHIL # BLD AUTO: 0.12 THOUSAND/ÂΜL (ref 0–0.61)
EOSINOPHIL NFR BLD AUTO: 1 % (ref 0–6)
ERYTHROCYTE [DISTWIDTH] IN BLOOD BY AUTOMATED COUNT: 13.2 % (ref 11.6–15.1)
GFR SERPL CREATININE-BSD FRML MDRD: 93 ML/MIN/1.73SQ M
GLUCOSE SERPL-MCNC: 130 MG/DL (ref 65–140)
GLUCOSE UR STRIP-MCNC: NEGATIVE MG/DL
HCT VFR BLD AUTO: 46.6 % (ref 34.8–46.1)
HGB BLD-MCNC: 15.3 G/DL (ref 11.5–15.4)
HGB UR QL STRIP.AUTO: ABNORMAL
IMM GRANULOCYTES # BLD AUTO: 0.04 THOUSAND/UL (ref 0–0.2)
IMM GRANULOCYTES NFR BLD AUTO: 0 % (ref 0–2)
KETONES UR STRIP-MCNC: NEGATIVE MG/DL
LEUKOCYTE ESTERASE UR QL STRIP: NEGATIVE
LYMPHOCYTES # BLD AUTO: 1.45 THOUSANDS/ÂΜL (ref 0.6–4.47)
LYMPHOCYTES NFR BLD AUTO: 13 % (ref 14–44)
MCH RBC QN AUTO: 29.4 PG (ref 26.8–34.3)
MCHC RBC AUTO-ENTMCNC: 32.8 G/DL (ref 31.4–37.4)
MCV RBC AUTO: 89 FL (ref 82–98)
MONOCYTES # BLD AUTO: 0.68 THOUSAND/ÂΜL (ref 0.17–1.22)
MONOCYTES NFR BLD AUTO: 6 % (ref 4–12)
NEUTROPHILS # BLD AUTO: 8.88 THOUSANDS/ÂΜL (ref 1.85–7.62)
NEUTS SEG NFR BLD AUTO: 80 % (ref 43–75)
NITRITE UR QL STRIP: NEGATIVE
NON-SQ EPI CELLS URNS QL MICRO: ABNORMAL /HPF
NRBC BLD AUTO-RTO: 0 /100 WBCS
PH UR STRIP.AUTO: 7.5 [PH]
PLATELET # BLD AUTO: 259 THOUSANDS/UL (ref 149–390)
PMV BLD AUTO: 9.8 FL (ref 8.9–12.7)
POTASSIUM SERPL-SCNC: 4.1 MMOL/L (ref 3.5–5.3)
PROT SERPL-MCNC: 7.5 G/DL (ref 6.4–8.4)
PROT UR STRIP-MCNC: NEGATIVE MG/DL
RBC # BLD AUTO: 5.21 MILLION/UL (ref 3.81–5.12)
RBC #/AREA URNS AUTO: ABNORMAL /HPF
SODIUM SERPL-SCNC: 135 MMOL/L (ref 135–147)
SP GR UR STRIP.AUTO: 1.01
UROBILINOGEN UR QL STRIP.AUTO: 0.2 E.U./DL
WBC # BLD AUTO: 11.21 THOUSAND/UL (ref 4.31–10.16)
WBC #/AREA URNS AUTO: ABNORMAL /HPF

## 2024-06-30 PROCEDURE — 99284 EMERGENCY DEPT VISIT MOD MDM: CPT | Performed by: EMERGENCY MEDICINE

## 2024-06-30 PROCEDURE — 81003 URINALYSIS AUTO W/O SCOPE: CPT | Performed by: EMERGENCY MEDICINE

## 2024-06-30 PROCEDURE — 74176 CT ABD & PELVIS W/O CONTRAST: CPT

## 2024-06-30 PROCEDURE — 36415 COLL VENOUS BLD VENIPUNCTURE: CPT | Performed by: EMERGENCY MEDICINE

## 2024-06-30 PROCEDURE — 85025 COMPLETE CBC W/AUTO DIFF WBC: CPT | Performed by: EMERGENCY MEDICINE

## 2024-06-30 PROCEDURE — 80053 COMPREHEN METABOLIC PANEL: CPT | Performed by: EMERGENCY MEDICINE

## 2024-06-30 PROCEDURE — 99284 EMERGENCY DEPT VISIT MOD MDM: CPT

## 2024-06-30 PROCEDURE — 81001 URINALYSIS AUTO W/SCOPE: CPT | Performed by: EMERGENCY MEDICINE

## 2024-06-30 PROCEDURE — 96374 THER/PROPH/DIAG INJ IV PUSH: CPT

## 2024-06-30 RX ORDER — TAMSULOSIN HYDROCHLORIDE 0.4 MG/1
0.4 CAPSULE ORAL
Qty: 7 CAPSULE | Refills: 0 | Status: SHIPPED | OUTPATIENT
Start: 2024-06-30 | End: 2024-07-07

## 2024-06-30 RX ORDER — ONDANSETRON 4 MG/1
4 TABLET, FILM COATED ORAL EVERY 6 HOURS PRN
Qty: 12 TABLET | Refills: 0 | Status: SHIPPED | OUTPATIENT
Start: 2024-06-30

## 2024-06-30 RX ORDER — OXYCODONE HYDROCHLORIDE 5 MG/1
5 TABLET ORAL EVERY 6 HOURS PRN
Qty: 7 TABLET | Refills: 0 | Status: SHIPPED | OUTPATIENT
Start: 2024-06-30 | End: 2024-07-05

## 2024-06-30 RX ORDER — KETOROLAC TROMETHAMINE 30 MG/ML
15 INJECTION, SOLUTION INTRAMUSCULAR; INTRAVENOUS ONCE
Status: COMPLETED | OUTPATIENT
Start: 2024-06-30 | End: 2024-06-30

## 2024-06-30 RX ADMIN — KETOROLAC TROMETHAMINE 15 MG: 30 INJECTION, SOLUTION INTRAMUSCULAR at 06:29

## 2024-06-30 NOTE — ED PROVIDER NOTES
History  Chief Complaint   Patient presents with    Flank Pain     Left sided flank pain wrapping into lower left abdomen. Frequency with urination     63 yo f with hx of kidney stones presenting to the ed for L flank pain which feels like it wraps around to the L side of her abdomen along with associated urinary frequency.  Denies fevers however states she been having chills and a few episodes of vomiting prior to arrival but no longer feels nauseous.  States the pain is actually mildly improved from earlier.  Did not take any medications for the pain.  States that in the past with her kidney stones she did require surgery for them.  States her last kidney stone was approximately 20 years ago.  Denies any other symptoms at this time.        Prior to Admission Medications   Prescriptions Last Dose Informant Patient Reported? Taking?   acetaminophen (TYLENOL) 500 mg tablet  Self Yes No   Sig: Take 1,000 mg by mouth every 6 (six) hours as needed for mild pain   ciclopirox (PENLAC) 8 % solution  Self No No   Sig: Apply topically daily at bedtime   ibuprofen (MOTRIN) 200 mg tablet  Self Yes No   Sig: Take 400 mg by mouth every 6 (six) hours as needed for mild pain   meloxicam (MOBIC) 15 mg tablet  Self No No   Sig: Take 1 tablet (15 mg total) by mouth daily   promethazine-dextromethorphan (PHENERGAN-DM) 6.25-15 mg/5 mL oral syrup  Self No No   Sig: Take 5 mL by mouth 4 (four) times a day as needed for cough      Facility-Administered Medications: None       Past Medical History:   Diagnosis Date    Allergic     [facility]    Arthritis 2022    X-ray    Dizziness     Kidney stone 2005    Obesity 2002    Palpitations     SOB (shortness of breath)     Tachycardia        Past Surgical History:   Procedure Laterality Date    BUNIONECTOMY Right     TONSILLECTOMY         Family History   Problem Relation Age of Onset    No Known Problems Mother     No Known Problems Father     No Known Problems Sister     No Known Problems  Daughter     No Known Problems Daughter     No Known Problems Maternal Grandmother     No Known Problems Maternal Grandfather     No Known Problems Paternal Grandmother     No Known Problems Paternal Grandfather     No Known Problems Son     No Known Problems Maternal Aunt     No Known Problems Maternal Aunt     No Known Problems Maternal Aunt     No Known Problems Paternal Aunt      I have reviewed and agree with the history as documented.    E-Cigarette/Vaping    E-Cigarette Use Never User      E-Cigarette/Vaping Substances    Nicotine No     THC No     CBD No     Flavoring No     Other No     Unknown No      Social History     Tobacco Use    Smoking status: Former     Current packs/day: 0.00     Average packs/day: 2.0 packs/day for 5.0 years (10.0 ttl pk-yrs)     Types: Cigarettes     Start date: 1977     Quit date:      Years since quittin.5    Smokeless tobacco: Never   Vaping Use    Vaping status: Never Used   Substance Use Topics    Alcohol use: Yes     Alcohol/week: 2.0 standard drinks of alcohol     Types: 2 Standard drinks or equivalent per week     Comment: moderate    Drug use: Never       Review of Systems   Constitutional:  Positive for chills.   Gastrointestinal:  Positive for nausea and vomiting.   Genitourinary:  Positive for dysuria, flank pain and frequency.   All other systems reviewed and are negative.      Physical Exam  Physical Exam  Vitals and nursing note reviewed.   Constitutional:       General: She is not in acute distress.     Appearance: She is well-developed. She is not diaphoretic.   HENT:      Head: Normocephalic and atraumatic.      Right Ear: External ear normal.      Left Ear: External ear normal.      Nose: Nose normal.   Eyes:      General: No scleral icterus.        Right eye: No discharge.         Left eye: No discharge.      Extraocular Movements: EOM normal.      Conjunctiva/sclera: Conjunctivae normal.      Pupils: Pupils are equal, round, and reactive to  light.   Neck:      Vascular: No JVD.      Trachea: No tracheal deviation.   Cardiovascular:      Rate and Rhythm: Normal rate and regular rhythm.      Heart sounds: Normal heart sounds. No murmur heard.     No friction rub. No gallop.   Pulmonary:      Effort: Pulmonary effort is normal. No respiratory distress.      Breath sounds: Normal breath sounds. No stridor. No wheezing or rales.   Abdominal:      General: Bowel sounds are normal. There is no distension.      Palpations: Abdomen is soft. There is no mass.      Tenderness: There is no abdominal tenderness. There is no right CVA tenderness, left CVA tenderness or guarding.   Musculoskeletal:         General: No tenderness, deformity or edema. Normal range of motion.      Cervical back: Normal range of motion and neck supple.   Skin:     General: Skin is warm and dry.      Coloration: Skin is not pale.      Findings: No erythema or rash.   Neurological:      Mental Status: She is alert and oriented to person, place, and time.      Cranial Nerves: No cranial nerve deficit.      Sensory: No sensory deficit.      Motor: No abnormal muscle tone.   Psychiatric:         Mood and Affect: Mood and affect normal.         Behavior: Behavior normal.         Thought Content: Thought content normal.         Judgment: Judgment normal.         Vital Signs  ED Triage Vitals [06/30/24 0605]   Temperature Pulse Respirations Blood Pressure SpO2   98.6 °F (37 °C) 68 18 (!) 208/115 97 %      Temp Source Heart Rate Source Patient Position - Orthostatic VS BP Location FiO2 (%)   Temporal Monitor Sitting Left arm --      Pain Score       7           Vitals:    06/30/24 0605 06/30/24 0700   BP: (!) 208/115 (!) 185/86   Pulse: 68 63   Patient Position - Orthostatic VS: Sitting          Visual Acuity      ED Medications  Medications   ketorolac (TORADOL) injection 15 mg (15 mg Intravenous Given 6/30/24 0629)       Diagnostic Studies  Results Reviewed       Procedure Component Value Units  Date/Time    Comprehensive metabolic panel [[id number]] Collected: 06/30/24 0631    Lab Status: Final result Specimen: Blood from Arm, Left Updated: 06/30/24 0719     Sodium 135 mmol/L      Potassium 4.1 mmol/L      Chloride 104 mmol/L      CO2 25 mmol/L      ANION GAP 6 mmol/L      BUN 22 mg/dL      Creatinine 0.65 mg/dL      Glucose 130 mg/dL      Calcium 9.0 mg/dL      AST 17 U/L      ALT 18 U/L      Alkaline Phosphatase 80 U/L      Total Protein 7.5 g/dL      Albumin 4.5 g/dL      Total Bilirubin 0.56 mg/dL      eGFR 93 ml/min/1.73sq m     Narrative:      National Kidney Disease Foundation guidelines for Chronic Kidney Disease (CKD):     Stage 1 with normal or high GFR (GFR > 90 mL/min/1.73 square meters)    Stage 2 Mild CKD (GFR = 60-89 mL/min/1.73 square meters)    Stage 3A Moderate CKD (GFR = 45-59 mL/min/1.73 square meters)    Stage 3B Moderate CKD (GFR = 30-44 mL/min/1.73 square meters)    Stage 4 Severe CKD (GFR = 15-29 mL/min/1.73 square meters)    Stage 5 End Stage CKD (GFR <15 mL/min/1.73 square meters)  Note: GFR calculation is accurate only with a steady state creatinine    Urine Microscopic [[id number]]  (Abnormal) Collected: 06/30/24 0631    Lab Status: Final result Specimen: Urine, Clean Catch Updated: 06/30/24 0645     RBC, UA 4-10 /hpf      WBC, UA 0-1 /hpf      Epithelial Cells Occasional /hpf      Bacteria, UA Occasional /hpf     CBC and differential [[id number]]  (Abnormal) Collected: 06/30/24 0631    Lab Status: Final result Specimen: Blood from Arm, Left Updated: 06/30/24 0641     WBC 11.21 Thousand/uL      RBC 5.21 Million/uL      Hemoglobin 15.3 g/dL      Hematocrit 46.6 %      MCV 89 fL      MCH 29.4 pg      MCHC 32.8 g/dL      RDW 13.2 %      MPV 9.8 fL      Platelets 259 Thousands/uL      nRBC 0 /100 WBCs      Segmented % 80 %      Immature Grans % 0 %      Lymphocytes % 13 %      Monocytes % 6 %      Eosinophils Relative 1 %      Basophils Relative 0 %      Absolute Neutrophils 8.88  Thousands/µL      Absolute Immature Grans 0.04 Thousand/uL      Absolute Lymphocytes 1.45 Thousands/µL      Absolute Monocytes 0.68 Thousand/µL      Eosinophils Absolute 0.12 Thousand/µL      Basophils Absolute 0.04 Thousands/µL     UA w Reflex to Microscopic w Reflex to Culture [[id number]]  (Abnormal) Collected: 06/30/24 0631    Lab Status: Final result Specimen: Urine, Clean Catch Updated: 06/30/24 0640     Color, UA Yellow     Clarity, UA Clear     Specific Gravity, UA 1.015     pH, UA 7.5     Leukocytes, UA Negative     Nitrite, UA Negative     Protein, UA Negative mg/dl      Glucose, UA Negative mg/dl      Ketones, UA Negative mg/dl      Urobilinogen, UA 0.2 E.U./dl      Bilirubin, UA Negative     Occult Blood, UA 2+                   CT renal stone study abdomen pelvis wo contrast   Final Result by Oleksandr Kenyon MD (06/30 0710)      Obstructing 4 mm calculus at the left ureterovesicular junction results in mild left hydroureteronephrosis.         The study was marked in EPIC for immediate notification.            Workstation performed: [id number]                    Procedures  Procedures         ED Course  ED Course as of 07/01/24 1446   Sun Jun 30, 2024   0644 Blood, UA(!): 2+                               SBIRT 20yo+      Flowsheet Row Most Recent Value   Initial Alcohol Screen: US AUDIT-C     1. How often do you have a drink containing alcohol? 0 Filed at: 06/30/2024 0607   2. How many drinks containing alcohol do you have on a typical day you are drinking?  0 Filed at: 06/30/2024 0607   3a. Male UNDER 65: How often do you have five or more drinks on one occasion? 0 Filed at: 06/30/2024 0607   3b. FEMALE Any Age, or MALE 65+: How often do you have 4 or more drinks on one occassion? 0 Filed at: 06/30/2024 0607   Audit-C Score 0 Filed at: 06/30/2024 0607   JAMIA: How many times in the past year have you...    Used an illegal drug or used a prescription medication for non-medical reasons? Never Filed at:  06/30/2024 0607                      Medical Decision Making  64-year-old female history of kidney stones presenting for left flank pain, nausea, vomiting and urinary frequency with dysuria.  Concern for the possibility of kidney stone and/or pyelonephritis.  Baseline blood work to evaluate kidney function, urinalysis, renal stone scan.  Trial pain control with Toradol.  Patient not nauseous on initial evaluation.    Signed out to Dr. Mcclure pending results of testing and ultimate disposition.     Amount and/or Complexity of Data Reviewed  Labs: ordered. Decision-making details documented in ED Course.  Radiology: ordered.    Risk  Prescription drug management.             Disposition  Final diagnoses:   Flank pain   Personal history of kidney stones   Left ureteral stone     Time reflects when diagnosis was documented in both MDM as applicable and the Disposition within this note       Time User Action Codes Description Comment    6/30/2024  6:28 AM Rodrigo Amos [R10.9] Flank pain     6/30/2024  6:29 AM Rodrigo Amos [Z87.442] Personal history of kidney stones     6/30/2024  7:32 AM Vernon Mcclure Add [N20.1] Left ureteral stone           ED Disposition       ED Disposition   Discharge    Condition   Stable    Date/Time   Sun Jun 30, 2024 0732    Comment   Makayla Ruano discharge to home/self care.                   Follow-up Information       Follow up With Specialties Details Why Contact Info    Vicente Flores MD Urology Schedule an appointment as soon as possible for a visit  For follow up of ureteral stone [address] [place] [address]  [phone]              Discharge Medication List as of 6/30/2024  7:34 AM        START taking these medications    Details   ondansetron (ZOFRAN) 4 mg tablet Take 1 tablet (4 mg total) by mouth every 6 (six) hours as needed for nausea or vomiting, Starting Sun 6/30/2024, Normal      oxyCODONE (ROXICODONE) 5 immediate release tablet Take 1 tablet (5 mg  total) by mouth every 6 (six) hours as needed for severe pain for up to 5 days Max Daily Amount: 20 mg, Starting Sun 6/30/2024, Until Fri 7/5/2024 at 2359, Normal      tamsulosin (FLOMAX) 0.4 mg Take 1 capsule (0.4 mg total) by mouth daily with dinner for 7 days, Starting Sun 6/30/2024, Until Sun 7/7/2024, Normal           CONTINUE these medications which have NOT CHANGED    Details   acetaminophen (TYLENOL) 500 mg tablet Take 1,000 mg by mouth every 6 (six) hours as needed for mild pain, Historical Med      ciclopirox (PENLAC) 8 % solution Apply topically daily at bedtime, Starting Fri 4/5/2024, Normal      ibuprofen (MOTRIN) 200 mg tablet Take 400 mg by mouth every 6 (six) hours as needed for mild pain, Historical Med      meloxicam (MOBIC) 15 mg tablet Take 1 tablet (15 mg total) by mouth daily, Starting Tue 1/30/2024, Normal      promethazine-dextromethorphan (PHENERGAN-DM) 6.25-15 mg/5 mL oral syrup Take 5 mL by mouth 4 (four) times a day as needed for cough, Starting Tue 1/30/2024, Normal                 PDMP Review       None            ED Provider  Electronically Signed by             Rodrigo Amos DO  07/01/24 0574

## 2024-06-30 NOTE — ED NOTES
Pt given urine strainer and hat with instructions for use. Pt verbalized understanding.      Blanca Vazquez RN  06/30/24 0518

## 2024-07-01 ENCOUNTER — TELEPHONE (OUTPATIENT)
Age: 64
End: 2024-07-01

## 2024-07-01 NOTE — TELEPHONE ENCOUNTER
New Patient    What is the reason for the patient’s appointment?: Pt was seen in ED on 6/30 for flank pain. Pt had imaging completed which showed:    IMPRESSION:     Obstructing 4 mm calculus at the left ureterovesicular junction results in mild left hydroureteronephrosis.    [facility] suggested scheduling within 1 week and no appts available in timeframe. Offered next available in [place] on 7/22 but pt is out of town that week. Please review for availability.     What office location does the patient prefer?: [place]    Does patient have Imaging/Lab Results: CT Scan 6/30/24    Have patient records been requested?: records in [facility]  If No, are the records showing in Epic:       INSURANCE:   Do we accept the patient's insurance or is the patient Self-Pay?:    Insurance Provider: BLUE CROSS   Plan Type/Number:   Member ID#: [id number]       HISTORY:   Has the patient had any previous Urologist(s)?: [facility] many years ago for kidney stones    Was the patient seen in the ED?: yes    Pt call back- [phone]

## 2024-07-02 NOTE — TELEPHONE ENCOUNTER
er note reviewed. OK for visit within 4-6 weeks if she can make that second date that is OK. if becomes symptomatic again/persist in that timeframe can reassess. likely she will pass this small distal stone.

## 2024-07-02 NOTE — TELEPHONE ENCOUNTER
Called and spoke with patient. Informed on AP's note. Patient is not having any symptoms at this time and believes she ma have passed it. She wishes to wait on an appt.

## 2024-07-05 DIAGNOSIS — N20.0 KIDNEY STONE: Primary | ICD-10-CM

## 2024-07-30 ENCOUNTER — OFFICE VISIT (OUTPATIENT)
Dept: OBGYN CLINIC | Facility: CLINIC | Age: 64
End: 2024-07-30
Payer: COMMERCIAL

## 2024-07-30 ENCOUNTER — APPOINTMENT (OUTPATIENT)
Dept: LAB | Facility: CLINIC | Age: 64
End: 2024-07-30
Payer: COMMERCIAL

## 2024-07-30 VITALS
TEMPERATURE: 99 F | HEIGHT: 66 IN | DIASTOLIC BLOOD PRESSURE: 87 MMHG | WEIGHT: 263.2 LBS | HEART RATE: 72 BPM | BODY MASS INDEX: 42.3 KG/M2 | SYSTOLIC BLOOD PRESSURE: 146 MMHG

## 2024-07-30 DIAGNOSIS — R53.83 OTHER FATIGUE: ICD-10-CM

## 2024-07-30 DIAGNOSIS — Z00.00 ANNUAL PHYSICAL EXAM: ICD-10-CM

## 2024-07-30 DIAGNOSIS — N20.0 KIDNEY STONE: ICD-10-CM

## 2024-07-30 DIAGNOSIS — Z13.29 SCREENING FOR THYROID DISORDER: ICD-10-CM

## 2024-07-30 DIAGNOSIS — M17.11 PRIMARY OSTEOARTHRITIS OF ONE KNEE, RIGHT: Primary | ICD-10-CM

## 2024-07-30 DIAGNOSIS — Z13.220 SCREENING CHOLESTEROL LEVEL: ICD-10-CM

## 2024-07-30 LAB
25(OH)D3 SERPL-MCNC: 35.1 NG/ML (ref 30–100)
ALBUMIN SERPL BCG-MCNC: 4.2 G/DL (ref 3.5–5)
ALP SERPL-CCNC: 77 U/L (ref 34–104)
ALT SERPL W P-5'-P-CCNC: 19 U/L (ref 7–52)
ANION GAP SERPL CALCULATED.3IONS-SCNC: 8 MMOL/L (ref 4–13)
AST SERPL W P-5'-P-CCNC: 18 U/L (ref 13–39)
BASOPHILS # BLD AUTO: 0.06 THOUSANDS/ÂΜL (ref 0–0.1)
BASOPHILS NFR BLD AUTO: 1 % (ref 0–1)
BILIRUB SERPL-MCNC: 0.65 MG/DL (ref 0.2–1)
BUN SERPL-MCNC: 25 MG/DL (ref 5–25)
CALCIUM SERPL-MCNC: 9.5 MG/DL (ref 8.4–10.2)
CHLORIDE SERPL-SCNC: 104 MMOL/L (ref 96–108)
CHOLEST SERPL-MCNC: 175 MG/DL
CO2 SERPL-SCNC: 27 MMOL/L (ref 21–32)
CREAT SERPL-MCNC: 0.59 MG/DL (ref 0.6–1.3)
EOSINOPHIL # BLD AUTO: 0.38 THOUSAND/ÂΜL (ref 0–0.61)
EOSINOPHIL NFR BLD AUTO: 4 % (ref 0–6)
ERYTHROCYTE [DISTWIDTH] IN BLOOD BY AUTOMATED COUNT: 13.5 % (ref 11.6–15.1)
GFR SERPL CREATININE-BSD FRML MDRD: 96 ML/MIN/1.73SQ M
GLUCOSE P FAST SERPL-MCNC: 99 MG/DL (ref 65–99)
HCT VFR BLD AUTO: 49.1 % (ref 34.8–46.1)
HDLC SERPL-MCNC: 58 MG/DL
HGB BLD-MCNC: 16.1 G/DL (ref 11.5–15.4)
IMM GRANULOCYTES # BLD AUTO: 0.04 THOUSAND/UL (ref 0–0.2)
IMM GRANULOCYTES NFR BLD AUTO: 0 % (ref 0–2)
LDLC SERPL CALC-MCNC: 94 MG/DL (ref 0–100)
LYMPHOCYTES # BLD AUTO: 2.14 THOUSANDS/ÂΜL (ref 0.6–4.47)
LYMPHOCYTES NFR BLD AUTO: 20 % (ref 14–44)
MCH RBC QN AUTO: 29.4 PG (ref 26.8–34.3)
MCHC RBC AUTO-ENTMCNC: 32.8 G/DL (ref 31.4–37.4)
MCV RBC AUTO: 90 FL (ref 82–98)
MONOCYTES # BLD AUTO: 1.06 THOUSAND/ÂΜL (ref 0.17–1.22)
MONOCYTES NFR BLD AUTO: 10 % (ref 4–12)
NEUTROPHILS # BLD AUTO: 6.84 THOUSANDS/ÂΜL (ref 1.85–7.62)
NEUTS SEG NFR BLD AUTO: 65 % (ref 43–75)
NONHDLC SERPL-MCNC: 117 MG/DL
NRBC BLD AUTO-RTO: 0 /100 WBCS
PLATELET # BLD AUTO: 305 THOUSANDS/UL (ref 149–390)
PMV BLD AUTO: 10 FL (ref 8.9–12.7)
POTASSIUM SERPL-SCNC: 4.4 MMOL/L (ref 3.5–5.3)
PROT SERPL-MCNC: 7.4 G/DL (ref 6.4–8.4)
RBC # BLD AUTO: 5.48 MILLION/UL (ref 3.81–5.12)
SODIUM SERPL-SCNC: 139 MMOL/L (ref 135–147)
TRIGL SERPL-MCNC: 115 MG/DL
TSH SERPL DL<=0.05 MIU/L-ACNC: 2.46 UIU/ML (ref 0.45–4.5)
WBC # BLD AUTO: 10.52 THOUSAND/UL (ref 4.31–10.16)

## 2024-07-30 PROCEDURE — 80061 LIPID PANEL: CPT

## 2024-07-30 PROCEDURE — 80053 COMPREHEN METABOLIC PANEL: CPT

## 2024-07-30 PROCEDURE — 36415 COLL VENOUS BLD VENIPUNCTURE: CPT

## 2024-07-30 PROCEDURE — 82306 VITAMIN D 25 HYDROXY: CPT

## 2024-07-30 PROCEDURE — 84443 ASSAY THYROID STIM HORMONE: CPT

## 2024-07-30 PROCEDURE — 99214 OFFICE O/P EST MOD 30 MIN: CPT | Performed by: FAMILY MEDICINE

## 2024-07-30 PROCEDURE — 82360 CALCULUS ASSAY QUANT: CPT

## 2024-07-30 PROCEDURE — 85025 COMPLETE CBC W/AUTO DIFF WBC: CPT

## 2024-07-30 NOTE — PROGRESS NOTES
"[facility] [address] [place]  [facility] [address] [phone]      Assessment:  1. Primary osteoarthritis of one knee, right  -     Injection Procedure Prior Authorization; Future  -     Ambulatory Referral to Physical Therapy; Future      Plan:  Patient Instructions   F/u for visco R knee  Begin physical therapy  Icing/heat/OTC pain meds as needed.     Return for f/u for visco R knee, Recheck.    Chief Complaint:  Chief Complaint   Patient presents with    Right Knee - Follow-up       Subjective:   HPI    Patient ID: Makayla Ruano is a 64 y.o. female     Here c/o R knee pain  Hx of OA  Steroid injection helped for a few days.  Having a lot of pain  Pain walking intermittently  Pain is 6/10  Throbbing pain/sharp pain at times      Review of Systems   Constitutional:  Negative for fatigue and fever.   Respiratory:  Negative for shortness of breath.    Cardiovascular:  Negative for chest pain.   Gastrointestinal:  Negative for abdominal pain and nausea.   Genitourinary:  Negative for dysuria.   Musculoskeletal:  Positive for arthralgias.   Skin:  Negative for rash and wound.   Neurological:  Negative for weakness and headaches.       Objective:  Vitals:  /87 (BP Location: Left arm, Patient Position: Sitting, Cuff Size: Large)   Pulse 72   Temp 99 °F (37.2 °C) (Tympanic)   Ht 5' 6\" (1.676 m)   Wt 119 kg (263 lb 3.2 oz)   BMI 42.48 kg/m²     The following portions of the patient's history were reviewed and updated as appropriate: allergies, current medications, past family history, past medical history, past social history, past surgical history, and problem list.    Physical exam:  Physical Exam  Constitutional:       Appearance: Normal appearance. She is normal weight.   HENT:      Head: Normocephalic.   Eyes:      Extraocular Movements: Extraocular movements intact.   Pulmonary:      Effort: Pulmonary effort is normal.   Musculoskeletal:      " Cervical back: Normal range of motion.      Right knee: No effusion.      Instability Tests: Medial Uli test positive. Lateral Uli test negative.   Skin:     General: Skin is warm and dry.   Neurological:      General: No focal deficit present.      Mental Status: She is alert and oriented to person, place, and time. Mental status is at baseline.   Psychiatric:         Mood and Affect: Mood normal.         Behavior: Behavior normal.         Thought Content: Thought content normal.         Judgment: Judgment normal.       Right Knee Exam     Tenderness   The patient is experiencing tenderness in the medial joint line.    Range of Motion   The patient has normal right knee ROM.    Tests   Uli:  Medial - positive Lateral - negative  Varus: negative Valgus: negative    Other   Swelling: none  Effusion: no effusion present          Observations     Right Knee   Negative for effusion.             Charlie Pat MD

## 2024-08-07 LAB
CALCIUM OXALATE DIHYDRATE MFR STONE IR: 30 %
COLOR STONE: NORMAL
COM MFR STONE: 70 %
COMMENT-STONE3: NORMAL
COMPOSITION: NORMAL
LABORATORY COMMENT REPORT: NORMAL
PHOTO: NORMAL
SIZE STONE: NORMAL MM
SPEC SOURCE SUBJ: NORMAL
STONE ANALYSIS-IMP: NORMAL
WT STONE: 18 MG

## 2024-08-08 ENCOUNTER — OFFICE VISIT (OUTPATIENT)
Dept: FAMILY MEDICINE CLINIC | Facility: CLINIC | Age: 64
End: 2024-08-08
Payer: COMMERCIAL

## 2024-08-08 VITALS
SYSTOLIC BLOOD PRESSURE: 146 MMHG | HEIGHT: 66 IN | OXYGEN SATURATION: 97 % | DIASTOLIC BLOOD PRESSURE: 82 MMHG | WEIGHT: 261.8 LBS | BODY MASS INDEX: 42.07 KG/M2 | TEMPERATURE: 98.2 F | RESPIRATION RATE: 16 BRPM | HEART RATE: 83 BPM

## 2024-08-08 DIAGNOSIS — Z12.31 ENCOUNTER FOR SCREENING MAMMOGRAM FOR MALIGNANT NEOPLASM OF BREAST: ICD-10-CM

## 2024-08-08 DIAGNOSIS — R79.89 ABNORMAL CBC: ICD-10-CM

## 2024-08-08 DIAGNOSIS — Z13.220 SCREENING CHOLESTEROL LEVEL: ICD-10-CM

## 2024-08-08 DIAGNOSIS — G47.33 OBSTRUCTIVE SLEEP APNEA SYNDROME: ICD-10-CM

## 2024-08-08 DIAGNOSIS — M25.561 CHRONIC PAIN OF RIGHT KNEE: ICD-10-CM

## 2024-08-08 DIAGNOSIS — R03.0 PRE-HYPERTENSION: Primary | ICD-10-CM

## 2024-08-08 DIAGNOSIS — G89.29 CHRONIC PAIN OF RIGHT KNEE: ICD-10-CM

## 2024-08-08 PROCEDURE — 99214 OFFICE O/P EST MOD 30 MIN: CPT | Performed by: NURSE PRACTITIONER

## 2024-08-08 NOTE — PATIENT INSTRUCTIONS
Keep monitor of blood pressure- call if consistently higher than 140/90- discussed starting Lisinopril  Consider physical therapy for knee pain  Consider sleep medicine consult with ALICIA Torres in [place] to discuss sleep apnea options  Start Aspirin 81mg daily for high red blood cell counts as discussed  Get repeat bloodwork before next visit in 6 months

## 2024-08-08 NOTE — PROGRESS NOTES
Ambulatory Visit  Name: Makayla Ruano      : 1960      MRN: [id number]  Encounter Provider: ALICIA Benson  Encounter Date: 2024   Encounter department: [facility]    Assessment & Plan   1. Pre-hypertension  -     Comprehensive metabolic panel; Future  2. Encounter for screening mammogram for malignant neoplasm of breast  -     Mammo screening bilateral w 3d & cad; Future  3. Screening cholesterol level  -     Lipid panel; Future  4. Abnormal CBC  Comments:  start Aspirin 81mg daily  Orders:  -     CBC and differential; Future  5. Obstructive sleep apnea syndrome  6. Chronic pain of right knee      Depression Screening and Follow-up Plan: Patient was screened for depression during today's encounter. They screened negative with a PHQ-2 score of 0.        History of Present Illness     Here for 6 month medcheck and lab review-  HTN- her blood pressure has been up for a few visits, reports the pain in her right knee has been worse- has seen Dr. Pat a few times.   Discussed thoroughly her knee pain, PT, knee replacement/shots/weight loss for better outcomes    Reviewed recent bloodwork- all questions answered  Reviewed current medications- all questions answered      Review of Systems   Constitutional:  Negative for activity change, diaphoresis, fatigue and fever.   HENT:  Negative for congestion, facial swelling, hearing loss, rhinorrhea, sinus pressure, sinus pain, sneezing, sore throat and voice change.    Eyes:  Negative for discharge and visual disturbance.   Respiratory:  Negative for cough, choking, chest tightness, shortness of breath, wheezing and stridor.    Cardiovascular:  Negative for chest pain, palpitations and leg swelling.   Gastrointestinal:  Negative for abdominal distention, abdominal pain, constipation, diarrhea, nausea and vomiting.   Endocrine: Negative for polydipsia, polyphagia and polyuria.   Genitourinary:  Negative for difficulty urinating,  dysuria, frequency and urgency.   Musculoskeletal:  Positive for arthralgias and gait problem.   Skin:  Negative for color change, rash and wound.   Neurological:  Negative for dizziness, syncope, speech difficulty, weakness, light-headedness and headaches.   Hematological:  Negative for adenopathy. Does not bruise/bleed easily.   Psychiatric/Behavioral:  Negative for agitation, behavioral problems, confusion, hallucinations, sleep disturbance and suicidal ideas. The patient is not nervous/anxious.      Past Medical History:   Diagnosis Date    Allergic     Whole Life    Arthritis     X-ray    Dizziness     Kidney stone     Obesity 2002    Palpitations     SOB (shortness of breath)     Tachycardia      Past Surgical History:   Procedure Laterality Date    BUNIONECTOMY Right     TONSILLECTOMY       Family History   Problem Relation Age of Onset    No Known Problems Mother     No Known Problems Father     No Known Problems Sister     No Known Problems Daughter     No Known Problems Daughter     No Known Problems Maternal Grandmother     No Known Problems Maternal Grandfather     No Known Problems Paternal Grandmother     No Known Problems Paternal Grandfather     No Known Problems Son     No Known Problems Maternal Aunt     No Known Problems Maternal Aunt     No Known Problems Maternal Aunt     No Known Problems Paternal Aunt      Social History     Tobacco Use    Smoking status: Former     Current packs/day: 0.00     Average packs/day: 2.0 packs/day for 5.0 years (10.0 ttl pk-yrs)     Types: Cigarettes     Start date: 1977     Quit date:      Years since quittin.6    Smokeless tobacco: Never   Vaping Use    Vaping status: Never Used   Substance and Sexual Activity    Alcohol use: Yes     Alcohol/week: 2.0 standard drinks of alcohol     Types: 2 Standard drinks or equivalent per week     Comment: moderate    Drug use: Never    Sexual activity: Yes     Partners: Male     Current Outpatient  "Medications on File Prior to Visit   Medication Sig    acetaminophen (TYLENOL) 500 mg tablet Take 1,000 mg by mouth every 6 (six) hours as needed for mild pain    ciclopirox (PENLAC) 8 % solution Apply topically daily at bedtime    ibuprofen (MOTRIN) 200 mg tablet Take 400 mg by mouth every 6 (six) hours as needed for mild pain    meloxicam (MOBIC) 15 mg tablet Take 1 tablet (15 mg total) by mouth daily    [DISCONTINUED] ondansetron (ZOFRAN) 4 mg tablet Take 1 tablet (4 mg total) by mouth every 6 (six) hours as needed for nausea or vomiting (Patient not taking: Reported on 7/30/2024)    [DISCONTINUED] promethazine-dextromethorphan (PHENERGAN-DM) 6.25-15 mg/5 mL oral syrup Take 5 mL by mouth 4 (four) times a day as needed for cough (Patient not taking: Reported on 7/30/2024)    [DISCONTINUED] tamsulosin (FLOMAX) 0.4 mg Take 1 capsule (0.4 mg total) by mouth daily with dinner for 7 days (Patient not taking: Reported on 7/30/2024)     Allergies   Allergen Reactions    Cat Hair Extract Other (See Comments)     Immunization History   Administered Date(s) Administered    COVID-19 MODERNA VACC 0.5 ML IM 04/20/2021, 05/18/2021    Tdap 01/30/2023     Objective     /82   Pulse 83   Temp 98.2 °F (36.8 °C) (Temporal)   Resp 16   Ht 5' 6\" (1.676 m)   Wt 119 kg (261 lb 12.8 oz)   SpO2 97%   BMI 42.26 kg/m²     Physical Exam  Vitals and nursing note reviewed.   Constitutional:       General: She is not in acute distress.     Appearance: She is well-developed. She is not diaphoretic.   Cardiovascular:      Rate and Rhythm: Normal rate and regular rhythm.      Heart sounds: Normal heart sounds.   Pulmonary:      Effort: Pulmonary effort is normal. No respiratory distress.      Breath sounds: Normal breath sounds. No wheezing.   Musculoskeletal:         General: Tenderness (right knee) present.      Cervical back: Normal range of motion and neck supple.      Right lower leg: No edema.      Left lower leg: No edema. "   Skin:     General: Skin is warm and dry.      Findings: No erythema or rash.   Neurological:      Mental Status: She is alert and oriented to person, place, and time.   Psychiatric:         Mood and Affect: Mood and affect and mood normal.         Behavior: Behavior normal.         Thought Content: Thought content normal.         Judgment: Judgment normal.

## 2024-08-12 ENCOUNTER — OFFICE VISIT (OUTPATIENT)
Dept: SLEEP CENTER | Facility: CLINIC | Age: 64
End: 2024-08-12
Payer: COMMERCIAL

## 2024-08-12 VITALS
HEART RATE: 69 BPM | OXYGEN SATURATION: 96 % | HEIGHT: 66 IN | SYSTOLIC BLOOD PRESSURE: 136 MMHG | WEIGHT: 265.4 LBS | TEMPERATURE: 98.1 F | DIASTOLIC BLOOD PRESSURE: 72 MMHG | BODY MASS INDEX: 42.65 KG/M2

## 2024-08-12 DIAGNOSIS — G47.01 INSOMNIA DUE TO MEDICAL CONDITION: ICD-10-CM

## 2024-08-12 DIAGNOSIS — G47.33 OBSTRUCTIVE SLEEP APNEA SYNDROME: Primary | ICD-10-CM

## 2024-08-12 PROCEDURE — 99214 OFFICE O/P EST MOD 30 MIN: CPT | Performed by: NURSE PRACTITIONER

## 2024-08-12 RX ORDER — ASPIRIN 81 MG/1
81 TABLET, CHEWABLE ORAL DAILY
COMMUNITY

## 2024-08-12 NOTE — PROGRESS NOTES
Progress Note - [facility]   Makayla Ruano 64 y.o. female   :1960, MRN: [id number]  2024          Follow Up Evaluation / Problem:     Mild Obstructive Sleep Apnea    ASSESSMENT/PLAN:      Diagnoses and all orders for this visit:    Obstructive sleep apnea syndrome  -     CPAP Auto New DME    Insomnia due to medical condition  Comments:  sleep maintenance insomnia  Orders:  -     CPAP Auto New DME    Other orders  -     aspirin 81 mg chewable tablet; Chew 81 mg daily        Thank you for the opportunity of participating in the evaluation and care of this patient in the Sleep Clinic at [facility].      HPI: Makayla Ruano 64 y.o. is a  female with PMHx of obesity and pre-hypertension, with past reports of dizziness, palpitations, shortness of breath and tachycardia, who presents for follow up of mild obstructive sleep apnea.  She had an initial consultation with Dr. Loving in May 2024.  She had concern of loud snoring, night time awakenings, including 1-2 times per night for urination, non-refreshing sleep.  [place] was noted at 15/24.    A diagnostic sleep study was ordered and completed in May 2024.  During the study, there were a total of 55 respiratory events made up of 4 obstructive apneas, 0 mixed apneas, 0 central apneas, and 51 hypopneas resulting in an apnea/hypopnea index (AHI) of 10.2 events per hour of sleep.  The AHI in the supine position was 18.4.  The AHI during REM sleep was 36.6.  Moderate intensity snoring was noted and there were 3 respiratory effort related arousals.   The amount of sleep time below 90% was 25.4 minutes.  The lowest oxygen saturation was 81.0%     She returns to review results of testing and options for treatment.    Current Outpatient Medications:     acetaminophen (TYLENOL) 500 mg tablet, Take 1,000 mg by mouth every 6 (six) hours as needed for mild pain, Disp: , Rfl:     aspirin 81 mg chewable tablet,  "Chew 81 mg daily, Disp: , Rfl:     ciclopirox (PENLAC) 8 % solution, Apply topically daily at bedtime, Disp: 6 mL, Rfl: 11    meloxicam (MOBIC) 15 mg tablet, Take 1 tablet (15 mg total) by mouth daily, Disp: 90 tablet, Rfl: 3    How likely are you to doze off or fall asleep in the following situations, in contrast to feeling just tired?  Sitting and reading: Slight chance of dozing  Watching TV: Moderate chance of dozing  Sitting, inactive in a public place (e.g. a theatre or a meeting): Would never doze  As a passenger in a car for an hour without a break: High chance of dozing  Lying down to rest in the afternoon when circumstances permit: High chance of dozing  Sitting and talking to someone: Would never doze  Sitting quietly after a lunch without alcohol: Would never doze  In a car, while stopped for a few minutes in traffic: Would never doze  Total score: 9              Vitals:    08/12/24 1038   BP: 136/72   BP Location: Right arm   Patient Position: Sitting   Cuff Size: Adult   Pulse: 69   Temp: 98.1 °F (36.7 °C)   SpO2: 96%   Weight: 120 kg (265 lb 6.4 oz)   Height: 5' 6\" (1.676 m)       Body mass index is 42.84 kg/m².            EPWORTH SLEEPINESS SCORE  Total score: 9      Past History Since Last Sleep Center Visit:   Weight loss of 4 lbs noted since the sleep study in May.      Sleep Follow Up Questionnaire    Are you happy with your sleep quality? Usually   Are you happy with your alertness during the day? Usually   What time do you typically go to bed on weekdays or workdays? 10:00 PM   What time do you typically go to bed weekends or days off? 11:00 PM   How long does it take to fall asleep? 0-15 minutes   How often does it take more than 30 minutes to fall asleep? Rare   How many times do you remember waking up during your sleep time on average? 1-2 times per night   How often is it hard for you to return to sleep after awakening in the middle of the night? Rare   What time do you typically wake up on " weekdays or workdays? 7:00 AM   What time do you typically wake up on weekends or days off? 7:00 AM   How many hours do you sleep on average? 7   Do you typically feel refreshed when you first awaken? Usually   Are you sleepy during the day? Rarely   Do you intentionally try to nap? Yes   How long do you sleep during a nap? 15-30 minutes   How often do you nap? 1-2 times per week   How often do you doze (fall asleep without intending to)? Never   Do you share a bed with someone at home? Yes, always   Do you use any of the following to stay awake? None of the above   Do you drink any of the following beverages more than a few days a week? None         The review of systems and following portions of the patient's history were reviewed and updated as appropriate: allergies, current medications, past family history, past medical history, past social history, past surgical history, and problem list.        OBJECTIVE    Physical Exam:     General Appearance:   Alert, cooperative, no distress, appears stated age, obese     Lungs:   Clear to auscultation bilaterally, respirations unlabored     Heart:   Regular rate and rhythm, S1 and S2 normal, no murmur, rub or gallop         Counseling / Coordination of Care  I have spent a total time of 35 minutes in caring for this patient on the day of the visit/encounter including Risks and benefits of tx options, Instructions for management, Patient and family education, Importance of tx compliance, Risk factor reductions, Impressions, Counseling / Coordination of care, Documenting in the medical record, Reviewing / ordering tests, medicine, procedures  , and Obtaining or reviewing history  .    I reviewed the recent test results with the patient.  We talked about the abnormal findings, including those consistent with Obstructive Sleep Apnea. We then discussed how the these are categorized as mild, moderate or severe.  We also covered the medical comorbidities associated with  untreated Obstructive Sleep Apnea including, hypertension, cardiac arrythmia, myocardial infarction and stroke.  I explained how the risk of these conditions increases with the severity of the test results.  I also spoke in some detail about the most common treatment options including weight loss, nasal PAP, mandibular advancement devices and uvulopalatopharyngoplasty (UPPP).  I answered all questions posed to me and gave my opinion regarding the best options for treatment based on the patient and their level of disease.  In this case she chose to begin treatment using APAP.    The patient will return 31-91 days after beginning use of PAP therapy for a review of compliance data collected after beginning treatment.  We will discuss this data and talk about any problems that may prevent successful treatment of Obstructive Sleep Apnea.  Changes will be made in treatment parameters or interface devices in order to improve her ability to continue using PAP to maintain upper airway patency during sleep.      The following instructions have been given to the patient today:    Patient Instructions   1. Schedule an appointment for set up of PAP equipment  2.  Begin using your equipment every night  3.  Begin cleaning your equipment, as recommended   4.  Contact your medical equipment distributor regarding any questions concerning your PAP equipment  5.  Contact the Sleep Disorders Center with any other questions, prior to next visit, if needed  6.  Schedule compliance follow-up visit in 31-91 days, as required by insurance         Nursing Support:  When:  Monday through Friday 7:00am-5:00pm, except holidays  Where:  Direct phone line is [phone], option 3  If you are having a true emergency, please call 911.  In the event that the line is busy or it is after hours, please leave a voice mail message and we will return your call.  Please speak clearly, leaving your full name, birth date, best number to reach you and the  reason for your call.  Medication refills:  We will need the name of the medication, the dosage, the ordering provider, whether you get a 30 day or 90 day refill and the pharmacy name and address.  Medications will be ordered by the providers only.  Nurses cannot call in prescriptions.    To reach the Sleep Disorders Center office staff:  Call [phone], option 1, followed by option 3      ALICIA Torres  [facility]

## 2024-08-12 NOTE — PATIENT INSTRUCTIONS
1. Schedule an appointment for set up of PAP equipment  2.  Begin using your equipment every night  3.  Begin cleaning your equipment, as recommended   4.  Contact your medical equipment distributor regarding any questions concerning your PAP equipment  5.  Contact the Sleep Disorders Center with any other questions, prior to next visit, if needed  6.  Schedule compliance follow-up visit in 31-91 days, as required by insurance         Nursing Support:  When:  Monday through Friday 7:00am-5:00pm, except holidays  Where:  Direct phone line is [phone], option 3  If you are having a true emergency, please call 911.  In the event that the line is busy or it is after hours, please leave a voice mail message and we will return your call.  Please speak clearly, leaving your full name, birth date, best number to reach you and the reason for your call.  Medication refills:  We will need the name of the medication, the dosage, the ordering provider, whether you get a 30 day or 90 day refill and the pharmacy name and address.  Medications will be ordered by the providers only.  Nurses cannot call in prescriptions.    To reach the Sleep Disorders Center office staff:  Call [phone], option 1, followed by option 3

## 2024-08-15 ENCOUNTER — EVALUATION (OUTPATIENT)
Dept: PHYSICAL THERAPY | Age: 64
End: 2024-08-15
Payer: COMMERCIAL

## 2024-08-15 ENCOUNTER — TELEPHONE (OUTPATIENT)
Dept: SLEEP CENTER | Facility: CLINIC | Age: 64
End: 2024-08-15

## 2024-08-15 DIAGNOSIS — M17.11 PRIMARY OSTEOARTHRITIS OF RIGHT KNEE: ICD-10-CM

## 2024-08-15 DIAGNOSIS — S83.512D SPRAIN OF ANTERIOR CRUCIATE LIGAMENT OF LEFT KNEE, SUBSEQUENT ENCOUNTER: Primary | ICD-10-CM

## 2024-08-15 PROCEDURE — 97161 PT EVAL LOW COMPLEX 20 MIN: CPT

## 2024-08-15 PROCEDURE — 97530 THERAPEUTIC ACTIVITIES: CPT

## 2024-08-15 PROCEDURE — 97110 THERAPEUTIC EXERCISES: CPT

## 2024-08-15 NOTE — PROGRESS NOTES
PT Evaluation     Today's date: 8/15/2024  Patient name: Makayla Ruano  : 1960  MRN: [id number]  Referring provider: Charlie Pat MD  Dx:   Encounter Diagnosis     ICD-10-CM    1. Sprain of anterior cruciate ligament of left knee, subsequent encounter  S83.512D       2. Primary osteoarthritis of right knee  M17.11           Start Time: 915  Stop Time: 1015  Total time in clinic (min): 60 minutes    Assessment  Impairments: abnormal gait, activity intolerance, impaired physical strength, lacks appropriate home exercise program, weight-bearing intolerance, participation limitations, activity limitations and endurance  Symptom irritability: low    Assessment details: Pt presents to therapy with referral for R knee OA. Based on subjective and the SAEED pt outlined, I decided to complete knee special tests in addition to functional testing. Knee special tests for ligamentous damage were negative. Functional testing demonstrated increased motion in the frontal plane with ambulation, indicating hip weakness, and pain following 5 x sit to stands. Based on evaluation findings and the reported SAEED (hyperextension injury) I believe the patient to have strained posterior musculature,HS however unable to MMT mms as patient is not comfortable in prone, and/or sprained the ACL ligament due to rotary instability, immediate swelling that is decreasing, and decreased weight bearing that is improving. Further imagining demonstrates age related OA of both knees worse in the medial compartment, her injury and the inflammatory process that follows may have exacerbated this. Pt is candidate for skilled physical therapy.   Understanding of Dx/Px/POC: good     Prognosis: good    Goals  In 4 visits:  Pt will improve FOTO score by 7 to meet LTGs  Pt will improved LQS resisted knee flexion from weak and painful to strong and painful to work toward LTG  Pt will not experience any additional cases of R knee instability       In 8  visits:   Pt will improve FOTO score from 57 pts to 70 pts to demonstrate a measurable change in physical status  Pt will improve 2 minute walk test from walking 300 ft with pain to walking 300 ft with out pain to be able to walk around in [place] in September   Pt will be able to reactively shuffle/tap cones to simulate dancing on her anniversary  Pt will improve 5 x sit to stand from 19 seconds to 15 seconds to show LE muscular gains and meet personal goals   Pt will demonstrate 100% competency of HEP to be appropriate for D/C from therapy after goals have been met, pt is functioning at OF, and/or the pt displays significant improvement.            Plan  Patient would benefit from: skilled physical therapy  Referral necessary: No  Planned modality interventions: biofeedback and low level laser therapy    Planned therapy interventions: abdominal trunk stabilization, activity modification, IASTM, joint mobilization, kinesiology taping, manual therapy, massage, Hurt taping, balance/weight bearing training, balance, patient/caregiver education, flexibility, functional ROM exercises, gait training, graded activity, graded exercise, graded motor, home exercise program, IADL retraining, therapeutic training, therapeutic exercise, therapeutic activities, strengthening and stretching    Frequency: 2x week  Duration in weeks: 4  Plan of Care beginning date: 8/15/2024  Plan of Care expiration date: 11/13/2024  Treatment plan discussed with: patient  Plan details: Pt was educated on the nature of their dx and the benefits of completing physical therapy. Additionally, pt is encouraged to ask questions regarding their dx and POC.      Pt was in agreement that they will be treated by myself in combination with a PTA. Further, informed that we work as a team, the PTAs follow the POC created by the PT, and I trust them to make safe and reasonable changes when appropriate under their scope of practice.          Subjective  Evaluation    History of Present Illness  Mechanism of injury: Pt presents to therapy   At the beginning of  she was sitting on the floor and her grandson sat on her knee and she felt discomfort. The next day she experienced tenderness. Two days later it gave out on her, after that she had difficulty weight bearing and walking. She presented to the emergency room three days after SAEED, and talked to Dr. Pat. Pt had cortisone shots and is now seeing if she is a candidate for gel shots. She notices increased temperature and swelling. She takes tylenol for arthritis.     PMH: bunion surgery 2019, no hip or back pain           Not a recurrent problem   Quality of life: good    Patient Goals  Patient goals for therapy: decreased edema, decreased pain, increased motion, increased strength and return to sport/leisure activities  Patient goal: be able to walk without a limp, dance at her anniversary in the middle of sept.  Pain  Current pain ratin  At best pain ratin  At worst pain rating: 10  Location: R knee medially  Quality: dull ache, burning, throbbing and pressure  Relieving factors: medications, heat, ice and rest  Aggravating factors: standing and stair climbing (turning)  Progression: worsening    Social Support  Steps to enter house: yes  3  Stairs in house: yes   8  Lives in: multiple-level home  Lives with: spouse    Employment status: not working  Hand dominance: right  Exercise history: walking on the TM, shopping, lawn maintenance      Diagnostic Tests  X-ray: normal  Treatments  Current treatment: injection treatment and massage        Objective     Observations     Right Knee   Positive for effusion.     Active Range of Motion   Left Knee   Normal active range of motion  Flexion: 120 degrees     Right Knee   Normal active range of motion  Flexion: 120 degrees     Passive Range of Motion   Left Knee   Flexion: 125 degrees with pain    Right Knee   Flexion: 125 degrees with pain    Mobility    Patellar Mobility:   Left Knee   Hypermobile: left medial, left lateral, left superior and left inferior      Right Knee   Hypermobile: medial, lateral, superior and inferior     Patellar Static Positioning   Left Knee: mirian  Right Knee: mirian    Strength/Myotome Testing     Left Knee   Flexion: 4+  Extension: 4+  Quadriceps contraction: good    Right Knee   Flexion: 4+  Extension: 4+  Quadriceps contraction: good    Additional Strength Details  Subjectively resisted knee flexion was more painful than resisted knee extension     Tests     Right Knee   Negative anterior drawer, anterior Lachman, lateral Uli, medial Uli, patellar compression, pivot shift, posterior drawer, posterior sag, Thessaly's test at 5 degrees, valgus stress test at 0 degrees, valgus stress test at 30 degrees, varus stress test at 0 degrees and varus stress test at 30 degrees.     Additional Tests Details  Eeg's test negative   Hurt testing negative     All special tests performed initially on the L as a comparison     Swelling     Left Knee Girth Measurement (cm)   Joint line: 18 (inches) cm    Right Knee Girth Measurement (cm)   Joint line: 19 (inches) cm    Ambulation     Observational Gait   Walking speed and stride length within functional limits.     Additional Observational Gait Details  B/l trendelenburg    Quality of Movement During Gait     Pelvis    Pelvis (Left): Positive Trendelenburg.   Pelvis (Right): Positive Trendelenburg.     Functional Assessment        Comments  5 x sit to stand: 19 seconds   --- no armrests  --- pain was present at the beginning, but did not increase throughout     mCTSIB: passed all conditions, moderate sway    2 minute walk test: 300 ft   --- pain throughout       Flowsheet Rows      Flowsheet Row Most Recent Value   PT/OT G-Codes    Current Score 57   Projected Score 70               POC expires Unit limit Auth Expiration date PT/OT + Visit Limit?   11/14/24 4  60 max                             Visit/Unit Tracking  AUTH Status:  Date 8/15              No auth needed  Used 1               Remaining  59               FOTO 57/70                    Precautions:       Manuals                                                                 Neuro Re-Ed             Cone taps              Reactive cone taps                           TherEx              TM walking 2'  5'  8'  10'       SLRs             Heel slides             HS sets at varying angles             Quad sets             bridges             sidestepping             Monster walks              LAQs              HS curls                                        Ther Activity             Activities to emulate dancing              Agility ladder              Gait Training             Resisted walking jessee             Resisted walking multi-direction             Modalities

## 2024-08-19 ENCOUNTER — OFFICE VISIT (OUTPATIENT)
Dept: PHYSICAL THERAPY | Age: 64
End: 2024-08-19
Payer: COMMERCIAL

## 2024-08-19 DIAGNOSIS — M17.11 PRIMARY OSTEOARTHRITIS OF RIGHT KNEE: ICD-10-CM

## 2024-08-19 DIAGNOSIS — S83.512D SPRAIN OF ANTERIOR CRUCIATE LIGAMENT OF LEFT KNEE, SUBSEQUENT ENCOUNTER: Primary | ICD-10-CM

## 2024-08-19 PROCEDURE — 97110 THERAPEUTIC EXERCISES: CPT

## 2024-08-19 NOTE — PROGRESS NOTES
"Daily Note     Today's date: 2024  Patient name: Makayla Ruano  : 1960  MRN: [id number]  Referring provider: Charlie Pat MD  Dx:   Encounter Diagnosis     ICD-10-CM    1. Sprain of anterior cruciate ligament of left knee, subsequent encounter  S83.512D       2. Primary osteoarthritis of right knee  M17.11           Start Time: 1115  Stop Time: 1200  Total time in clinic (min): 45 minutes    Subjective: Pt felt sore after initial evaluation. She cancelled her gel shots for the R knee, she wants to see if there is any improvement with PT.       Objective: See treatment diary below      Assessment: Introduced patient to POC following initial evaluation. Care focused on HS and glute strengthening. Tolerated treatment well. Patient demonstrated fatigue post treatment      Plan: Continue per plan of care.      POC expires Unit limit Auth Expiration date PT/OT + Visit Limit?   24 4  60 max                            Visit/Unit Tracking  AUTH Status:  Date 8/15 8/19             No auth needed  Used 1 2              Remaining  59               FOTO 57/70                    Precautions:       Manuals                                                                 Neuro Re-Ed             Cone slides              Reactive cone slides                           TherEx              TM walking 2'  5'  8'  10'       SLRs             Heel slides x30            HS sets at varying angles 90*, 60*  X15 10\"             Quad sets X15 10\"            bridges 2x10             sidestepping 2x30ft             Monster walks  2x30ft             LAQs              HS curls  2# 2x10                                       Ther Activity             Activities to emulate dancing - cone taps              Agility ladder              Gait Training             Resisted walking jessee             Resisted walking multi-direction             Modalities                                            "

## 2024-08-22 ENCOUNTER — OFFICE VISIT (OUTPATIENT)
Dept: PHYSICAL THERAPY | Age: 64
End: 2024-08-22
Payer: COMMERCIAL

## 2024-08-22 DIAGNOSIS — M17.11 PRIMARY OSTEOARTHRITIS OF RIGHT KNEE: ICD-10-CM

## 2024-08-22 DIAGNOSIS — S83.512D SPRAIN OF ANTERIOR CRUCIATE LIGAMENT OF LEFT KNEE, SUBSEQUENT ENCOUNTER: Primary | ICD-10-CM

## 2024-08-22 LAB

## 2024-08-22 PROCEDURE — 97110 THERAPEUTIC EXERCISES: CPT

## 2024-08-26 ENCOUNTER — OFFICE VISIT (OUTPATIENT)
Dept: PHYSICAL THERAPY | Age: 64
End: 2024-08-26
Payer: COMMERCIAL

## 2024-08-26 DIAGNOSIS — S83.512D SPRAIN OF ANTERIOR CRUCIATE LIGAMENT OF LEFT KNEE, SUBSEQUENT ENCOUNTER: Primary | ICD-10-CM

## 2024-08-26 DIAGNOSIS — M17.11 PRIMARY OSTEOARTHRITIS OF RIGHT KNEE: ICD-10-CM

## 2024-08-26 LAB
DME PARACHUTE DELIVERY DATE ACTUAL: NORMAL
DME PARACHUTE DELIVERY DATE EXPECTED: NORMAL
DME PARACHUTE DELIVERY DATE REQUESTED: NORMAL
DME PARACHUTE ITEM DESCRIPTION: NORMAL
DME PARACHUTE ORDER STATUS: NORMAL
DME PARACHUTE SUPPLIER NAME: NORMAL
DME PARACHUTE SUPPLIER PHONE: NORMAL

## 2024-08-26 PROCEDURE — 97110 THERAPEUTIC EXERCISES: CPT

## 2024-08-26 NOTE — PROGRESS NOTES
Daily Note     Today's date: 2024  Patient name: Makayla Ruano  : 1960  MRN: [id number]  Referring provider: Charlie Pat MD  Dx:   Encounter Diagnosis     ICD-10-CM    1. Sprain of anterior cruciate ligament of left knee, subsequent encounter  S83.512D       2. Primary osteoarthritis of right knee  M17.11           Start Time: 0930  Stop Time: 1015  Total time in clinic (min): 45 minutes    Subjective: Pt's chief complaint is pain with walking.      Objective: Said patient would benefit from the affects of low level laser therapy to decrease pain, inflammation, edema, and/or promote tissue healing. Prior to including low level laser modality to patient's POC cleared them of any precautions or contraindications. Details below:     Precautions:  Tattoos in the region recommended - no  Decreased sensation - no    Contraindications:   Cancer - no  Over the thyroid - no  Over uterus of pregnant woman - no  Over eyes - no   Over carotids - no    Parameters used:   Area: 200   V:25  Chronicity:  12   See treatment diary below      Assessment: Added laser modality based on patients subjective and presentation, she tolerated this well. Increased TM walking to 5 minutes. Continued current POC only by increasing resistance or repetitions as outlined below.  Tolerated treatment well. Patient demonstrated fatigue post treatment      Plan: Continue per plan of care.      POC expires Unit limit Auth Expiration date PT/OT + Visit Limit?   24 4  60 max                            Visit/Unit Tracking  AUTH Status:  Date 8/15 8/19 8/22 8/26           No auth needed  Used 1 2 3 4            Remaining  59 58 57 56            FOTO 57/70                    Precautions:       Manuals           Laser    200  25  12   JM                                                  Neuro Re-Ed             Cone slides              Reactive cone slides                           TherEx              TM walking 2' 2' 5'  8'  " 10'       SLRs  X20  X20           Heel slides x30 X30  x30 Add weight          HS sets at varying angles 90*, 60*  X15 10\"  90*, 60*  X15 10\"  90*, 60*  X20 5\"          Quad sets X15 10\" X10 10\"  X20 5\"           bridges 2x10  X20  X20           sidestepping 2x30ft  2x30ft blue ankles  2x30ft           Monster walks  2x30ft  2x30ft blue ankles            LAQs   2# 2x10  2# 2x10           HS curls  2# 2x10  2# 2x10            Calf stretch  4x30\" 4x30\"                        RDLs                                                     Ther Activity             Activities to emulate dancing - cone taps              Agility ladder              Gait Training             Resisted walking jesseedebra fischer          Resisted walking multi-direction             Modalities                                                "

## 2024-08-29 ENCOUNTER — OFFICE VISIT (OUTPATIENT)
Dept: PHYSICAL THERAPY | Age: 64
End: 2024-08-29
Payer: COMMERCIAL

## 2024-08-29 DIAGNOSIS — S83.512D SPRAIN OF ANTERIOR CRUCIATE LIGAMENT OF LEFT KNEE, SUBSEQUENT ENCOUNTER: Primary | ICD-10-CM

## 2024-08-29 DIAGNOSIS — M17.11 PRIMARY OSTEOARTHRITIS OF RIGHT KNEE: ICD-10-CM

## 2024-08-29 PROCEDURE — 97110 THERAPEUTIC EXERCISES: CPT

## 2024-08-29 NOTE — PROGRESS NOTES
"Daily Note     Today's date: 2024  Patient name: Makayla Ruano  : 1960  MRN: [id number]  Referring provider: Charlie Pat MD  Dx:   Encounter Diagnosis     ICD-10-CM    1. Sprain of anterior cruciate ligament of left knee, subsequent encounter  S83.512D       2. Primary osteoarthritis of right knee  M17.11                      Subjective: Pt reports she felt pretty good a for a few days after her last session.       Objective: See treatment diary below      Assessment: Pt did well with all TE as listed, reports no increased pain during or post tx session.       Plan: Continue per plan of care.      POC expires Unit limit Auth Expiration date PT/OT + Visit Limit?   24 4  60 max                            Visit/Unit Tracking  AUTH Status:  Date 8/15 8/19 8/22 8/26 8/29          No auth needed  Used 1 2 3 4 5           Remaining  59 58 57 56 55           FOTO 57/70    NV                Precautions:       Manuals          Laser    200  25  12   JM  200 25  12  HA                                                Neuro Re-Ed             Cone slides              Reactive cone slides                           TherEx              TM walking 2' 2' 5' 6' 8'  10'       SLRs  X20  X20           Heel slides x30 X30  x30 Add weight          HS sets at varying angles 90*, 60*  X15 10\"  90*, 60*  X15 10\"  90*, 60*  X20 5\" 90*, 60*  X20 5\"         Quad sets X15 10\" X10 10\"  X20 5\"  X20  5\"         bridges 2x10  X20  X20  x20         sidestepping 2x30ft  2x30ft blue ankles  2x30ft  Blue 2x30ft         Monster walks  2x30ft  2x30ft blue ankles   Blue 2x30ft         LAQs   2# 2x10  2# 2x10  2# 2x10         HS curls  2# 2x10  2# 2x10   2# 2x10         Calf stretch  4x30\" 4x30\"                        RDLs                                                     Ther Activity             Activities to emulate dancing - cone taps              Agility ladder              Gait Training             Resisted " walking jessee fischer          Resisted walking multi-direction             Modalities

## 2024-09-04 ENCOUNTER — APPOINTMENT (OUTPATIENT)
Dept: PHYSICAL THERAPY | Age: 64
End: 2024-09-04
Payer: COMMERCIAL

## 2024-09-06 ENCOUNTER — OFFICE VISIT (OUTPATIENT)
Dept: PHYSICAL THERAPY | Age: 64
End: 2024-09-06
Payer: COMMERCIAL

## 2024-09-06 DIAGNOSIS — M17.11 PRIMARY OSTEOARTHRITIS OF RIGHT KNEE: ICD-10-CM

## 2024-09-06 DIAGNOSIS — S83.512D SPRAIN OF ANTERIOR CRUCIATE LIGAMENT OF LEFT KNEE, SUBSEQUENT ENCOUNTER: Primary | ICD-10-CM

## 2024-09-06 PROCEDURE — 97140 MANUAL THERAPY 1/> REGIONS: CPT

## 2024-09-06 PROCEDURE — 97110 THERAPEUTIC EXERCISES: CPT

## 2024-09-06 NOTE — PROGRESS NOTES
"Daily Note     Today's date: 2024  Patient name: Makayla Ruano  : 1960  MRN: [id number]  Referring provider: Charlie Pat MD  Dx:   Encounter Diagnosis     ICD-10-CM    1. Sprain of anterior cruciate ligament of left knee, subsequent encounter  S83.512D       2. Primary osteoarthritis of right knee  M17.11           Start Time: 1015  Stop Time: 1115  Total time in clinic (min): 60 minutes    Subjective: Pt states that she has noticed progress but is still nervous about walking on vacation next week. She had some scheduling concerns.       Objective: See treatment diary below      Assessment: Addressed schedule. Added resisted walking, RDLs, and squats, provided verbal and visual cues. Patient had difficulty completing RDLs with proper form. The new interventions properly challenged the patient. Tolerated treatment well. Patient demonstrated fatigue post treatment      Plan: Continue per plan of care.      POC expires Unit limit Auth Expiration date PT/OT + Visit Limit?   24 4  60 max                            Visit/Unit Tracking  AUTH Status:  Date 8/15 8/19 8/22 8/26 8/29 9/6         No auth needed  Used 1 2 3 4 5 6          Remaining  59 58 57 56 55 54          FOTO 57/70    NV                Precautions:       Manuals         Laser    200  25  12   JM  200 25  12   25  12  HA                                               Neuro Re-Ed             Cone slides              Reactive cone slides                           TherEx              TM walking 2' 2' 5' 6' 8'  10'       SLRs  X20  X20           Heel slides x30 X30  x30          HS sets at varying angles 90*, 60*  X15 10\"  90*, 60*  X15 10\"  90*, 60*  X20 5\" 90*, 60*  X20 5\" 90*, 60*  X20 5\"        Quad sets X15 10\" X10 10\"  X20 5\"  X20  5\" TKE x20 5\"         bridges 2x10  X20  X20  x20 Toes up 2x10         sidestepping 2x30ft  2x30ft blue ankles  2x30ft  Blue 2x30ft Blue 2x30ft        Monster walks  2x30ft  " "2x30ft blue ankles   Blue 2x30ft Blue 2x30ft        LAQs   2# 2x10  2# 2x10  2# 2x10 3# 2x10         HS curls  2# 2x10  2# 2x10   2# 2x10 3# 2x10         Calf stretch  4x30\" 4x30\"   4x30\"                      RDLs      X20 cueing         Squats      x20                                  Ther Activity             Activities to emulate dancing - cone taps              Agility ladder      nv        Gait Training             Resisted walking jessee   nv  X10, #10 fwd, lateral        Resisted walking multi-direction             Modalities                                                    "

## 2024-09-09 ENCOUNTER — OFFICE VISIT (OUTPATIENT)
Dept: PHYSICAL THERAPY | Age: 64
End: 2024-09-09
Payer: COMMERCIAL

## 2024-09-09 DIAGNOSIS — M17.11 PRIMARY OSTEOARTHRITIS OF RIGHT KNEE: ICD-10-CM

## 2024-09-09 DIAGNOSIS — S83.512D SPRAIN OF ANTERIOR CRUCIATE LIGAMENT OF LEFT KNEE, SUBSEQUENT ENCOUNTER: Primary | ICD-10-CM

## 2024-09-09 PROCEDURE — 97530 THERAPEUTIC ACTIVITIES: CPT

## 2024-09-09 PROCEDURE — 97110 THERAPEUTIC EXERCISES: CPT

## 2024-09-12 ENCOUNTER — APPOINTMENT (OUTPATIENT)
Dept: PHYSICAL THERAPY | Age: 64
End: 2024-09-12
Payer: COMMERCIAL

## 2024-09-17 ENCOUNTER — OFFICE VISIT (OUTPATIENT)
Dept: PHYSICAL THERAPY | Age: 64
End: 2024-09-17
Payer: COMMERCIAL

## 2024-09-17 DIAGNOSIS — S83.512D SPRAIN OF ANTERIOR CRUCIATE LIGAMENT OF LEFT KNEE, SUBSEQUENT ENCOUNTER: Primary | ICD-10-CM

## 2024-09-17 DIAGNOSIS — M17.11 PRIMARY OSTEOARTHRITIS OF RIGHT KNEE: ICD-10-CM

## 2024-09-17 PROCEDURE — 97530 THERAPEUTIC ACTIVITIES: CPT

## 2024-09-17 PROCEDURE — 97110 THERAPEUTIC EXERCISES: CPT

## 2024-09-17 NOTE — PROGRESS NOTES
"Daily Note     Today's date: 2024  Patient name: Makayla Ruano  : 1960  MRN: [id number]  Referring provider: Charlie Pat MD  Dx:   Encounter Diagnosis     ICD-10-CM    1. Sprain of anterior cruciate ligament of left knee, subsequent encounter  S83.512D       2. Primary osteoarthritis of right knee  M17.11           Start Time: 09  Stop Time: 1030  Total time in clinic (min): 49 minutes    Subjective: Reports doing a lot of walking while she was away, felt good leading up to her trip but is now very sore.       Objective: See treatment diary below      Assessment: Did not progress program today due to elevated pain levels, see subjective. Patient was able to resume activities performed prior to trip today without need for modifications. Reviewed proper performance of exercises. Fatigue post session. Patient would benefit from continued PT.      Plan: Continue per plan of care.      POC expires Unit limit Auth Expiration date PT/OT + Visit Limit?   24 4  60 max                            Visit/Unit Tracking  AUTH Status:  Date 8/15 8/19 8/22 8/26 8/29 9/6 9/9 9/17       No auth needed  Used 1 2 3 4 5 6 7 8        Remaining  59 58 57 56 55 54 53 52        FOTO 57/70    NV                Precautions:       Manuals  Re-eval      Laser    200  25  12   JM  200 25  12   25  12   25  12   25  15 (med/post knee)  JR                                              Neuro Re-Ed             Cone slides              Reactive cone slides                           TherEx              TM walking 2' 2' 5' 6' 8' 8' no hands 1.2+ mph 8' no hands 2.0+ mph 10'     SLRs  X20  X20           Heel slides x30 X30  x30          HS sets at varying angles 90*, 60*  X15 10\"  90*, 60*  X15 10\"  90*, 60*  X20 5\" 90*, 60*  X20 5\" 90*, 60*  X20 5\" 90*, 60*  X10 10\" 3 angles 10\"x3 ea       Quad sets X15 10\" X10 10\"  X20 5\"  X20  5\" TKE x20 5\"  TKE x20 5\"  TKE  ball on wall x20 5\"  " "     bridges 2x10  X20  X20  x20 Toes up 2x10  2x10  2x10      sidestepping 2x30ft  2x30ft blue ankles  2x30ft  Blue 2x30ft  Red knees 2x30ft  Blue knees 2x30 ft      Monster walks  2x30ft  2x30ft blue ankles   Blue 2x30ft  Red knees 2x30ft  Blue knees 2x30 ft      LAQs   2# 2x10  2# 2x10  2# 2x10 3# 2x10         HS curls  2# 2x10  2# 2x10   2# 2x10 3# 2x10         Calf stretch  4x30\" 4x30\"   4x30\"  4x30\" L3 30\"x4                    RDLs      X20 cueing         Squats      X20 mini X20 mini 20x mini                                Ther Activity             Activities to emulate dancing - cone taps              Agility ladder       nv NV       Gait Training             Resisted walking jessee   nv  X10, #10 fwd, lateral X10 10# fwd  X10 10# fwd       Resisted walking multi-direction             Modalities                                                        "

## 2024-09-19 ENCOUNTER — EVALUATION (OUTPATIENT)
Dept: PHYSICAL THERAPY | Age: 64
End: 2024-09-19
Payer: COMMERCIAL

## 2024-09-19 DIAGNOSIS — M17.11 PRIMARY OSTEOARTHRITIS OF RIGHT KNEE: ICD-10-CM

## 2024-09-19 DIAGNOSIS — S83.512D SPRAIN OF ANTERIOR CRUCIATE LIGAMENT OF LEFT KNEE, SUBSEQUENT ENCOUNTER: Primary | ICD-10-CM

## 2024-09-19 PROCEDURE — 97530 THERAPEUTIC ACTIVITIES: CPT

## 2024-09-19 NOTE — PROGRESS NOTES
PT Re-Evaluation     Today's date: 2024  Patient name: Makayla Ruano  : 1960  MRN: [id number]  Referring provider: Charlie Pat MD  Dx:   Encounter Diagnosis     ICD-10-CM    1. Sprain of anterior cruciate ligament of left knee, subsequent encounter  S83.512D       2. Primary osteoarthritis of right knee  M17.11             Start Time: 1045  Stop Time: 1130  Total time in clinic (min): 45 minutes    Assessment  Impairments: abnormal gait, activity intolerance, impaired physical strength, lacks appropriate home exercise program, weight-bearing intolerance, participation limitations, activity limitations and endurance  Symptom irritability: low    Assessment details: Pt presents to therapy with referral for R knee OA. Based on subjective and the SAEED pt outlined, I decided to complete knee special tests in addition to functional testing. Knee special tests for ligamentous damage were negative. Functional testing demonstrated increased motion in the frontal plane with ambulation, indicating hip weakness, and pain following 5 x sit to stands. Based on evaluation findings and the reported SAEED (hyperextension injury) I believe the patient to have strained posterior musculature,HS however unable to MMT mms as patient is not comfortable in prone, and/or sprained the ACL ligament due to rotary instability, immediate swelling that is decreasing, and decreased weight bearing that is improving. Further imagining demonstrates age related OA of both knees worse in the medial compartment, her injury and the inflammatory process that follows may have exacerbated this. Pt is candidate for skilled physical therapy.     24  Compared re-evaluation findings to initial evaluation findings and the patients subjective and functional testing did not improve. Unfortunately the patient is experiencing a lot of pain in her knees following vacation. The original PT dx was for an ACL sprain of the R knee and prior to vacation  her pain rating was improving; however, after multiple days of walking for long durations her pain increased and remains this week. We discussed reaching out to her doctor to schedule imaging and reconsider the gel shots for the arthritis in her knees. I will keep the patients chart open at this time.     Understanding of Dx/Px/POC: good     Prognosis: good    Goals  In 4 visits:  Pt will improve FOTO score by 7 to meet LTGs - not met   Pt will improved LQS resisted knee flexion from weak and painful to strong and painful to work toward LTG - met   Pt will not experience any additional cases of R knee instability - met       In 8 visits:   Pt will improve FOTO score from 57 pts to 70 pts to demonstrate a measurable change in physical status- not met  Pt will improve 2 minute walk test from walking 300 ft with pain to walking 300 ft with out pain to be able to walk around in [place] in September - not met   Pt will be able to reactively shuffle/tap cones to simulate dancing on her anniversary - not met   Pt will improve 5 x sit to stand from 19 seconds to 15 seconds to show LE muscular gains and meet personal goals - not met   Pt will demonstrate 100% competency of HEP to be appropriate for D/C from therapy after goals have been met, pt is functioning at PLOF, and/or the pt displays significant improvement. - met           Plan  Patient would benefit from: skilled physical therapy  Referral necessary: No  Planned modality interventions: biofeedback and low level laser therapy    Planned therapy interventions: abdominal trunk stabilization, activity modification, IASTM, joint mobilization, kinesiology taping, manual therapy, massage, Hurt taping, balance/weight bearing training, balance, patient/caregiver education, flexibility, functional ROM exercises, gait training, graded activity, graded exercise, graded motor, home exercise program, IADL retraining, therapeutic training, therapeutic exercise, therapeutic  activities, strengthening and stretching    Frequency: 2x week  Duration in weeks: 4  Plan of Care beginning date: 8/15/2024  Plan of Care expiration date: 2024  Treatment plan discussed with: patient  Plan details: Pt was educated on the nature of their dx and the benefits of completing physical therapy. Additionally, pt is encouraged to ask questions regarding their dx and POC.      Pt was in agreement that they will be treated by myself in combination with a PTA. Further, informed that we work as a team, the PTAs follow the POC created by the PT, and I trust them to make safe and reasonable changes when appropriate under their scope of practice.          Subjective Evaluation    History of Present Illness  Mechanism of injury: Pt presents to therapy   At the beginning of  she was sitting on the floor and her grandson sat on her knee and she felt discomfort. The next day she experienced tenderness. Two days later it gave out on her, after that she had difficulty weight bearing and walking. She presented to the emergency room three days after SAEED, and talked to Dr. Pat. Pt had cortisone shots and is now seeing if she is a candidate for gel shots. She notices increased temperature and swelling. She takes tylenol for arthritis.     PMH: bunion surgery 2019, no hip or back pain     24  Pt felt a lot of pain over the weekend while walking on vacation. While in [place] she walked the stadium.           Not a recurrent problem   Quality of life: good    Patient Goals  Patient goals for therapy: decreased edema, decreased pain, increased motion, increased strength and return to sport/leisure activities  Patient goal: be able to walk without a limp, dance at her anniversary in the middle of sept.  Pain  Current pain ratin  At best pain ratin  At worst pain rating: 10  Location: R knee medially  Quality: dull ache, burning, throbbing and pressure  Relieving factors: medications, heat, ice and  rest  Aggravating factors: standing and stair climbing (turning)  Progression: worsening    Social Support  Steps to enter house: yes  3  Stairs in house: yes   8  Lives in: multiple-level home  Lives with: spouse    Employment status: not working  Hand dominance: right  Exercise history: walking on the TM, shopping, lawn maintenance      Diagnostic Tests  X-ray: normal  Treatments  Current treatment: injection treatment and massage        Objective     Observations     Right Knee   Positive for effusion.     Active Range of Motion   Left Knee   Normal active range of motion  Flexion: 120 degrees     Right Knee   Normal active range of motion  Flexion: 120 degrees     Passive Range of Motion   Left Knee   Flexion: 125 degrees with pain    Right Knee   Flexion: 125 degrees with pain    Mobility   Patellar Mobility:   Left Knee   Hypermobile: left medial, left lateral, left superior and left inferior      Right Knee   Hypermobile: medial, lateral, superior and inferior     Patellar Static Positioning   Left Knee: mirian  Right Knee: mirian    Strength/Myotome Testing     Left Knee   Flexion: 4+  Extension: 4+  Quadriceps contraction: good    Right Knee   Flexion: 4+  Extension: 4+  Quadriceps contraction: good    Additional Strength Details  Subjectively resisted knee flexion was more painful than resisted knee extension     Tests     Right Knee   Negative anterior drawer, anterior Lachman, lateral Uli, medial Uli, patellar compression, pivot shift, posterior drawer, posterior sag, Thessaly's test at 5 degrees, valgus stress test at 0 degrees, valgus stress test at 30 degrees, varus stress test at 0 degrees and varus stress test at 30 degrees.     Additional Tests Details  Eeg's test negative   Hurt testing negative     All special tests performed initially on the L as a comparison     Swelling     Left Knee Girth Measurement (cm)   Joint line: 18 (inches) cm    Right Knee Girth Measurement (cm)   Joint  "line: 19 (inches) cm    Ambulation     Observational Gait   Walking speed and stride length within functional limits.     Additional Observational Gait Details  B/l trendelenburg    Quality of Movement During Gait     Pelvis    Pelvis (Left): Positive Trendelenburg.   Pelvis (Right): Positive Trendelenburg.     Functional Assessment        Comments  5 x sit to stand: 19 seconds   --- no armrests  --- pain was present at the beginning, but did not increase throughout     mCTSIB: passed all conditions, moderate sway    2 minute walk test: 300 ft   --- pain throughout     9/19/24  5 x sit to stand: 19 seconds   -- no armrests  -- pain at the beginning     2 minute walk test: 350 ft   -- pain throughout, less compared to initial                  POC expires Unit limit Auth Expiration date PT/OT + Visit Limit?   11/14/24 4  60 max                            Visit/Unit Tracking  AUTH Status:  Date 8/15 8/19 8/22 8/26 8/29 9/6 9/9 9/17 9/19      No auth needed  Used 1 2 3 4 5 6 7 8 9       Remaining  59 58 57 56 55 54 53 52 51       FOTO 57/70    NV                Precautions:       Manuals 8/19 8/22 8/26 8/29 9/6 9/9 9/17 9/19     Laser    200  25  12   JM  200 25  12   25  12   25  12   25  15 (med/post knee)  JR                                              Neuro Re-Ed             Cone slides              Reactive cone slides                           TherEx              TM walking 2' 2' 5' 6' 8' 8' no hands 1.2+ mph 8' no hands 2.0+ mph 10'     SLRs  X20  X20           Heel slides x30 X30  x30          HS sets at varying angles 90*, 60*  X15 10\"  90*, 60*  X15 10\"  90*, 60*  X20 5\" 90*, 60*  X20 5\" 90*, 60*  X20 5\" 90*, 60*  X10 10\" 3 angles 10\"x3 ea       Quad sets X15 10\" X10 10\"  X20 5\"  X20  5\" TKE x20 5\"  TKE x20 5\"  TKE  ball on wall x20 5\"       bridges 2x10  X20  X20  x20 Toes up 2x10  2x10  2x10      sidestepping 2x30ft  2x30ft blue ankles  2x30ft  Blue 2x30ft  Red knees 2x30ft  Blue knees " "2x30 ft      Monster walks  2x30ft  2x30ft blue ankles   Blue 2x30ft  Red knees 2x30ft  Blue knees 2x30 ft      LAQs   2# 2x10  2# 2x10  2# 2x10 3# 2x10         HS curls  2# 2x10  2# 2x10   2# 2x10 3# 2x10         Calf stretch  4x30\" 4x30\"   4x30\"  4x30\" L3 30\"x4                    RDLs      X20 cueing         Squats      X20 mini X20 mini 20x mini                                Ther Activity             Activities to emulate dancing - cone taps              Agility ladder       nv NV       Gait Training             Resisted walking jessee   nv  X10, #10 fwd, lateral X10 10# fwd  X10 10# fwd       Resisted walking multi-direction             Modalities                                              "

## 2024-11-13 ENCOUNTER — OFFICE VISIT (OUTPATIENT)
Dept: SLEEP CENTER | Facility: CLINIC | Age: 64
End: 2024-11-13
Payer: COMMERCIAL

## 2024-11-13 VITALS
HEIGHT: 66 IN | SYSTOLIC BLOOD PRESSURE: 128 MMHG | DIASTOLIC BLOOD PRESSURE: 79 MMHG | BODY MASS INDEX: 43.23 KG/M2 | WEIGHT: 269 LBS

## 2024-11-13 DIAGNOSIS — G47.33 OBSTRUCTIVE SLEEP APNEA TREATED WITH CONTINUOUS POSITIVE AIRWAY PRESSURE (CPAP): Primary | ICD-10-CM

## 2024-11-13 DIAGNOSIS — G47.61 PLMD (PERIODIC LIMB MOVEMENT DISORDER): ICD-10-CM

## 2024-11-13 DIAGNOSIS — G47.01 INSOMNIA DUE TO MEDICAL CONDITION: ICD-10-CM

## 2024-11-13 PROCEDURE — 99214 OFFICE O/P EST MOD 30 MIN: CPT | Performed by: NURSE PRACTITIONER

## 2024-11-13 NOTE — PROGRESS NOTES
Progress Note - [facility]   Makayla Ruano 64 y.o. female   :1960, MRN: [id number]  2024        Follow Up Evaluation / Problem:     Mild Obstructive Sleep Apnea    ASSESSMENT/PLAN:      Diagnoses and all orders for this visit:    Obstructive sleep apnea treated with continuous positive airway pressure (CPAP)  -     Resmed DME Pressure Change  -     PAP DME Resupply/Reorder    Insomnia due to medical condition    PLMD (periodic limb movement disorder)  -     Vitamin B12; Future  -     Magnesium; Future  -     Iron Panel (Includes Ferritin, Iron Sat%, Iron, and TIBC); Future          Thank you for the opportunity of participating in the evaluation and care of this patient in the Sleep Clinic at [facility] [place].      HPI: Makayla Ruano 64 y.o. is a  female with PMHx of obesity and pre-hypertension, with past reports of dizziness, palpitations, shortness of breath and tachycardia, who presents for follow up of mild obstructive sleep apnea.  She had an initial consultation with Dr. Loving in May 2024.  She had concern of loud snoring, night time awakenings, including 1-2 times per night for urination, non-refreshing sleep.  [place] was noted at 1524.    A diagnostic sleep study was ordered and completed in May 2024.  During the study, there were a total of 55 respiratory events made up of 4 obstructive apneas, 0 mixed apneas, 0 central apneas, and 51 hypopneas resulting in an apnea/hypopnea index (AHI) of 10.2 events per hour of sleep.  The AHI in the supine position was 18.4.  The AHI during REM sleep was 36.6.  Moderate intensity snoring was noted and there were 3 respiratory effort related arousals.   The amount of sleep time below 90% was 25.4 minutes.  The lowest oxygen saturation was 81.0%     She was set up with CPAP equipment and returns to review compliance and effectiveness of treatment.    Current Outpatient Medications:     acetaminophen  "(TYLENOL) 500 mg tablet, Take 1,000 mg by mouth every 6 (six) hours as needed for mild pain, Disp: , Rfl:     aspirin 81 mg chewable tablet, Chew 81 mg daily, Disp: , Rfl:     ciclopirox (PENLAC) 8 % solution, Apply topically daily at bedtime, Disp: 6 mL, Rfl: 11    meloxicam (MOBIC) 15 mg tablet, Take 1 tablet (15 mg total) by mouth daily, Disp: 90 tablet, Rfl: 3    How likely are you to doze off or fall asleep in the following situations, in contrast to feeling just tired?  Sitting and reading: Slight chance of dozing  Watching TV: Slight chance of dozing  Sitting, inactive in a public place (e.g. a theatre or a meeting): Would never doze  As a passenger in a car for an hour without a break: High chance of dozing  Lying down to rest in the afternoon when circumstances permit: Would never doze  Sitting and talking to someone: Would never doze  Sitting quietly after a lunch without alcohol: Slight chance of dozing  In a car, while stopped for a few minutes in traffic: Would never doze  Total score: 6              Vitals:    11/13/24 1016   BP: 128/79   BP Location: Left arm   Patient Position: Sitting   Cuff Size: Large   Weight: 122 kg (269 lb)   Height: 5' 6\" (1.676 m)       Body mass index is 43.42 kg/m².            EPWORTH SLEEPINESS SCORE  Total score: 6      CPAP DME:  Equipment set up date:  8/26/24  PAP Pressure: Nasal AutoPAP using a lower limit of 4cm and an upper limit of 20 cm of water pressure  Type of mask used: full face  DME Provider: [facility]      Past History Since Last Sleep Center Visit:   She was set up with CPAP equipment in August.  She was initially able to meet the compliance requirements.  Lately, she has not been using the equipment as regularly.  She feels the mask is bothersome and notices air leaks.  The starting setting is comfortable, however, she notices it becomes very strong at times during the night and she turns the machine off and back on to reset to a lower " pressure.        Are you sleepy during the day? Sometimes (P)    Do you intentionally try to nap? No, but would if given the opportunity (P)    How often is it hard for you to return to sleep after awakening in the middle of the night? Never (P)    How many hours do you sleep on average? 7 (P)    Do you typically feel refreshed when you first awaken? Usually (P)    Do you use any of the following to stay awake? None of the above (P)    Do you drink any of the following beverages more than a few days a week? None (P)    Are you happy with your sleep quality? Usually (P)    Are you happy with your alertness during the day? Usually (P)    How long does it take to fall asleep? 0-15 minutes (P)    How often does it take more than 30 minutes to fall asleep? Rare (P)    If you do wake during the night, how many times do you typically wake up during your sleep time? 1-2 times per night (P)    What time do you typically wake up on weekdays or workdays? 6:30 AM (P)    What time do you typically wake up on weekends or days off? 6:30 AM (P)    How often do you doze (fall asleep without intending to)? A few times a month (P)    Do you share a bed with someone at home? Yes, always (P)    What time do you typically go to bed on weekdays or workdays? 10:30 PM (P)    What time do you typically go to bed weekends or days off? 10:30 PM (P)    What limits you using CPAP/BiPAP/ASV regularly? Mask discomfort (P)    Please identify any issue or difficulties that you may be experiencing Dry mouth;Air leaks from mask;Mask comes off during sleep (P)    How often do you sleep through the night without waking up? Rare (P)          The review of systems and following portions of the patient's history were reviewed and updated as appropriate: allergies, current medications, past family history, past medical history, past social history, past surgical history, and problem list.        OBJECTIVE      Current PAP Compliance Data:  Average usage:   She has been able to use the equipment 60% of all days recorded.  Average usage was 4 or more hours 33% of all days recorded.  In the initial period, she used 100% of the days and at 97% for more than 4 hours  Average hours used:  5 hours and 2 minutes  Average time in an air leak:  large   Average pressure:  12.8 cm of water pressure  Residual AHI:  2.4/hour, including 0.8 OA, 0.0 CA, 1.3 hypopneas, 0.3 unknown    Physical Exam:     General Appearance:   Alert, cooperative, no distress, appears stated age, obese     Lungs:   Clear to auscultation bilaterally, respirations unlabored     Heart:   Regular rate and rhythm, S1 and S2 normal, no murmur, rub or gallop         Counseling / Coordination of Care  I have spent a total time of 35 minutes in caring for this patient on the day of the visit/encounter including Risks and benefits of tx options, Instructions for management, Patient and family education, Importance of tx compliance, Risk factor reductions, Impressions, Counseling / Coordination of care, Documenting in the medical record, Reviewing / ordering tests, medicine, procedures  , and Obtaining or reviewing history  .    The patient feels she benefits from the use of the equipment and would like to continue PAP treatment.  Response to treatment has been fairly good.  A pressure change was ordered today.  A prescription for supplies has been provided to last for the next year.    We also discussed periodic limb movements noted during the diagnostic study, some noted with arousal and  may be waking her.  She will complete lab testing, as ordered.  If levels are low, supplements will be recommended.  She is not interested in taking any medication for PLMD at this time.    She will continue using this equipment at the settings noted above for the next 6 months.  At that time she will return for a routine follow-up evaluation. I have asked the patient to contact the Sleep Disorders Center if any difficulties are  encountered prior to that time.      The following instructions have been given to the patient today:    Patient Instructions   1.  Continue use of CPAP equipment nightly  2.  Continue to clean your equipment, as discussed  3.  Contact the Sleep Disorders Center with any questions or concerns prior to your next visit, as needed  4.  Schedule visit for follow-up in 6 months   5.  Monitor you Lukkin maxwell and call if events are consistently higher than 5  6.  Complete labs as fasting - nothing past midnight except water      Nursing Support:  When:  Monday through Friday 7:00am-5:00pm, except holidays  Where:  Direct phone line is [phone], option 3  If you are having a true emergency, please call 911.  In the event that the line is busy or it is after hours, please leave a voice mail message and we will return your call.  Please speak clearly, leaving your full name, birth date, best number to reach you and the reason for your call.  Medication refills:  We will need the name of the medication, the dosage, the ordering provider, whether you get a 30 day or 90 day refill and the pharmacy name and address.  Medications will be ordered by the providers only.  Nurses cannot call in prescriptions.    To reach the Sleep Disorders Center office staff:  Call [phone], option 1, followed by option 3      ALICIA Torres  [facility]

## 2024-11-13 NOTE — PATIENT INSTRUCTIONS
1.  Continue use of CPAP equipment nightly  2.  Continue to clean your equipment, as discussed  3.  Contact the Sleep Disorders Center with any questions or concerns prior to your next visit, as needed  4.  Schedule visit for follow-up in 6 months   5.  Monitor you MyAir maxwell and call if events are consistently higher than 5  6.  Complete labs as fasting - nothing past midnight except water      Nursing Support:  When:  Monday through Friday 7:00am-5:00pm, except holidays  Where:  Direct phone line is [phone], option 3  If you are having a true emergency, please call 911.  In the event that the line is busy or it is after hours, please leave a voice mail message and we will return your call.  Please speak clearly, leaving your full name, birth date, best number to reach you and the reason for your call.  Medication refills:  We will need the name of the medication, the dosage, the ordering provider, whether you get a 30 day or 90 day refill and the pharmacy name and address.  Medications will be ordered by the providers only.  Nurses cannot call in prescriptions.    To reach the [facility] office staff:  Call [phone], option 1, followed by option 3

## 2024-11-17 ENCOUNTER — TELEPHONE (OUTPATIENT)
Dept: SLEEP CENTER | Facility: CLINIC | Age: 64
End: 2024-11-17

## 2024-11-21 LAB
DME PARACHUTE DELIVERY DATE REQUESTED: NORMAL
DME PARACHUTE ITEM DESCRIPTION: NORMAL
DME PARACHUTE ORDER STATUS: NORMAL
DME PARACHUTE SUPPLIER NAME: NORMAL
DME PARACHUTE SUPPLIER PHONE: NORMAL

## 2024-12-06 DIAGNOSIS — I48.0 PAROXYSMAL ATRIAL FIBRILLATION (HCC): Primary | ICD-10-CM

## 2025-01-22 ENCOUNTER — CONSULT (OUTPATIENT)
Dept: CARDIOLOGY CLINIC | Facility: CLINIC | Age: 65
End: 2025-01-22
Payer: COMMERCIAL

## 2025-01-22 VITALS
TEMPERATURE: 97.6 F | OXYGEN SATURATION: 98 % | HEIGHT: 66 IN | HEART RATE: 83 BPM | SYSTOLIC BLOOD PRESSURE: 154 MMHG | RESPIRATION RATE: 16 BRPM | DIASTOLIC BLOOD PRESSURE: 98 MMHG | WEIGHT: 266.6 LBS | BODY MASS INDEX: 42.85 KG/M2

## 2025-01-22 DIAGNOSIS — E66.813 CLASS 3 SEVERE OBESITY DUE TO EXCESS CALORIES WITH SERIOUS COMORBIDITY AND BODY MASS INDEX (BMI) OF 40.0 TO 44.9 IN ADULT (HCC): ICD-10-CM

## 2025-01-22 DIAGNOSIS — I48.0 PAROXYSMAL ATRIAL FIBRILLATION (HCC): Primary | ICD-10-CM

## 2025-01-22 DIAGNOSIS — E66.01 CLASS 3 SEVERE OBESITY DUE TO EXCESS CALORIES WITH SERIOUS COMORBIDITY AND BODY MASS INDEX (BMI) OF 40.0 TO 44.9 IN ADULT (HCC): ICD-10-CM

## 2025-01-22 DIAGNOSIS — I10 PRIMARY HYPERTENSION: ICD-10-CM

## 2025-01-22 DIAGNOSIS — G47.33 OBSTRUCTIVE SLEEP APNEA TREATED WITH CONTINUOUS POSITIVE AIRWAY PRESSURE (CPAP): ICD-10-CM

## 2025-01-22 PROBLEM — E66.9 OBESE: Status: ACTIVE | Noted: 2025-01-22

## 2025-01-22 PROCEDURE — 99204 OFFICE O/P NEW MOD 45 MIN: CPT | Performed by: INTERNAL MEDICINE

## 2025-01-22 PROCEDURE — 93000 ELECTROCARDIOGRAM COMPLETE: CPT | Performed by: INTERNAL MEDICINE

## 2025-01-22 RX ORDER — METOPROLOL SUCCINATE 25 MG/1
25 TABLET, EXTENDED RELEASE ORAL DAILY
Qty: 30 TABLET | Refills: 6 | Status: SHIPPED | OUTPATIENT
Start: 2025-01-22

## 2025-01-22 NOTE — PROGRESS NOTES
Patient ID: Makayla Ruano is a 65 y.o. female.        Plan:      Assessment & Plan  Paroxysmal atrial fibrillation (HCC)  -Possibly seen on rhythm strips from [employer] mobile maxwell however difficult to truly discern  -Patient wishes to trial medical therapy and additional monitoring prior to initiation of oral anticoagulation  -Will trial metoprolol succinate 25 mg daily  -Follow-up ambulatory event monitor and echocardiographic imaging.  Primary hypertension  -Counseled patient on dietary lifestyle modifications  -Trial metoprolol succinate 25 mg daily  -Continue to monitor  Obstructive sleep apnea treated with continuous positive airway pressure (CPAP)  -Counseled patient on dietary and lifestyle modifications  -Continue CPAP therapy  -Continue to monitor  Class 3 severe obesity due to excess calories with serious comorbidity and body mass index (BMI) of 40.0 to 44.9 in adult (HCC)  -Counseled on dietary and lifestyle modification along new weight reduction  -Will give referral to weight management.      Follow up Plan/Other summary comments:  -Prior to initiation of oral anticoagulation patient wishes to have more definitive evaluation with ambulatory event monitoring to assist in overall diagnosis of atrial fibrillation.  -Given intermittent nature of patient's symptoms we will check 1 month ambulatory event monitor with additional recommendations pending  -Will check transthoracic echocardiogram.  -As patient's blood pressure is elevated and given concern for hypertension will initiate metoprolol succinate 25 mg daily and patient will monitor home blood pressure readings let our office know significantly elevated greater than 130/80's MHG for further titration of medical therapy  -Will repeat laboratory analysis including CBC, CMP, magnesium, fasting lipid panel and TSH levels  -If patient does have significant atrial fibrillation burden as she wishes to avoid medical therapy as much as possible we will then give  referral to electrophysiology for discussion about more durable rhythm control strategy and possible ablation  -Will see patient in 3 months or sooner if necessary  -Patient counseled if she were to have any warning or alarm type symptoms she is to seek emergency medical care immediately    HPI:   -Patient is a 65-year-old female with documented obstructive sleep apnea compliant with CPAP therapy, obesity, prehypertension, chronic pain and right knee and presents to the office today for evaluation after referral from primary care physician for palpitations.  Diagnosis listed on referral is paroxysmal atrial fibrillation however the patient denies ever having this formally diagnosed and denies any ECG or previous event monitor which has shown this.  I had even messaged the patient's primary care physician prior to this office visit and she did not note this being an issue for the patient however it was the associated diagnosis with consult.  -Currently in the office today patient denies any active chest pain, palpitations, lightheadedness or dizziness, loss of consciousness, shortness of breath, lower extremity edema, orthopnea or bendopnea.  She states that she has had further issues with her palpitations for at least the last 6 months.  She states that they happen at least once per month.  She notes that when these episodes happen they last several hours at a time and then seem to resolve on their own.  She does not notice any particular inciting event that leads to these.  -Patient does have a mobile maxwell and rhythm strips on there do seem to show possible SVT versus PAF however difficult to determine.  When patient is in her arrhythmia rate seem to be in the 120s to 130s beat per minute range but can go up to 150s-160s and while she has not had any significant loss of consciousness does not feel right in it.  -Patient denies any tobacco or illicit drug use and has occasional social alcohol use.  -Patient's family  "history of heart disease with father having ruptured aorta passing from this at age 76.      Most recent or relevant cardiac/vascular testing:    -ECG performed in the office today 1/22/2025 showing sinus rhythm heart rate 70 bpm    -Exercise stress ECG 2/22/2023 showing no diagnostic evidence of ischemia on stress ECG.      Past Surgical History:   Procedure Laterality Date    BUNIONECTOMY Right     TONSILLECTOMY         Review of Systems   Review of Systems   Constitutional:  Negative for chills, diaphoresis, fatigue and fever.   HENT:  Negative for trouble swallowing and voice change.    Eyes:  Negative for pain and redness.   Respiratory:  Negative for shortness of breath and wheezing.    Cardiovascular:  Negative for chest pain, palpitations and leg swelling.   Gastrointestinal:  Negative for abdominal pain, constipation, diarrhea, nausea and vomiting.   Genitourinary:  Negative for dysuria.   Musculoskeletal:  Positive for arthralgias. Negative for neck pain and neck stiffness.   Skin:  Negative for rash.   Neurological:  Negative for dizziness, syncope, light-headedness and headaches.   Psychiatric/Behavioral:  Negative for agitation and confusion.    All other systems reviewed and are negative.         Objective:     /98   Pulse 83   Temp 97.6 °F (36.4 °C)   Resp 16   Ht 5' 6\" (1.676 m)   Wt 121 kg (266 lb 9.6 oz)   SpO2 98%   BMI 43.03 kg/m²     PHYSICAL EXAM:  Physical Exam  Vitals reviewed.   Constitutional:       General: She is not in acute distress.     Appearance: She is obese. She is not diaphoretic.   HENT:      Head: Normocephalic and atraumatic.   Eyes:      General:         Right eye: No discharge.         Left eye: No discharge.   Neck:      Comments: Trachea midline, neck obese, difficult to assess JVD  Cardiovascular:      Rate and Rhythm: Normal rate and regular rhythm.      Heart sounds:      No friction rub.   Pulmonary:      Effort: Pulmonary effort is normal. No respiratory " distress.      Breath sounds: No wheezing.   Chest:      Chest wall: No tenderness.   Abdominal:      General: Bowel sounds are normal.      Palpations: Abdomen is soft.      Tenderness: There is no abdominal tenderness. There is no rebound.   Musculoskeletal:      Right lower leg: No edema.      Left lower leg: No edema.   Skin:     General: Skin is warm and dry.   Neurological:      Mental Status: She is alert.      Comments: Awake, alert, able to answer questions appropriately, able move extremities bilaterally.   Psychiatric:         Mood and Affect: Mood normal.         Behavior: Behavior normal.          Meds reviewed.  Current Outpatient Medications on File Prior to Visit   Medication Sig Dispense Refill    aspirin 81 mg chewable tablet Chew 81 mg daily      ciclopirox (PENLAC) 8 % solution Apply topically daily at bedtime 6 mL 11    meloxicam (MOBIC) 15 mg tablet Take 1 tablet (15 mg total) by mouth daily 90 tablet 3    acetaminophen (TYLENOL) 500 mg tablet Take 1,000 mg by mouth every 6 (six) hours as needed for mild pain       No current facility-administered medications on file prior to visit.      Past Medical History:   Diagnosis Date    Allergic     [facility]    Arthritis     X-ray    Dizziness     Kidney stone     Obesity     Palpitations     SOB (shortness of breath)     Tachycardia        Social History     Tobacco Use   Smoking Status Former    Current packs/day: 0.00    Average packs/day: 2.0 packs/day for 5.0 years (10.0 ttl pk-yrs)    Types: Cigarettes    Start date: 1977    Quit date:     Years since quittin.0   Smokeless Tobacco Never     Family History   Problem Relation Age of Onset    No Known Problems Mother     No Known Problems Father     No Known Problems Sister     No Known Problems Daughter     No Known Problems Daughter     No Known Problems Maternal Grandmother     No Known Problems Maternal Grandfather     No Known Problems Paternal Grandmother     No  Known Problems Paternal Grandfather     No Known Problems Son     No Known Problems Maternal Aunt     No Known Problems Maternal Aunt     No Known Problems Maternal Aunt     No Known Problems Paternal Aunt

## 2025-01-22 NOTE — ASSESSMENT & PLAN NOTE
-Counseled patient on dietary and lifestyle modifications  -Continue CPAP therapy  -Continue to monitor

## 2025-01-22 NOTE — ASSESSMENT & PLAN NOTE
-Counseled on dietary and lifestyle modification along new weight reduction  -Will give referral to weight management.

## 2025-01-22 NOTE — ASSESSMENT & PLAN NOTE
-Counseled patient on dietary lifestyle modifications  -Trial metoprolol succinate 25 mg daily  -Continue to monitor

## 2025-01-28 ENCOUNTER — TELEPHONE (OUTPATIENT)
Dept: CARDIOLOGY CLINIC | Facility: CLINIC | Age: 65
End: 2025-01-28

## 2025-01-28 ENCOUNTER — TELEPHONE (OUTPATIENT)
Age: 65
End: 2025-01-28

## 2025-01-28 DIAGNOSIS — I10 PRIMARY HYPERTENSION: ICD-10-CM

## 2025-01-28 DIAGNOSIS — I48.0 PAROXYSMAL ATRIAL FIBRILLATION (HCC): ICD-10-CM

## 2025-01-28 RX ORDER — METOPROLOL SUCCINATE 25 MG/1
25 TABLET, EXTENDED RELEASE ORAL 2 TIMES DAILY
Qty: 60 TABLET | Refills: 6 | Status: SHIPPED | OUTPATIENT
Start: 2025-01-28

## 2025-01-28 NOTE — TELEPHONE ENCOUNTER
I was able to call and speak with patient given concern for possible paroxysmal atrial fibrillation she is agreeable to up titration of metoprolol therapy to 25 mg twice daily and initiation of Eliquis 5 mg twice daily.  Will attempt to obtain additional rhythm strips for confirmation and meantime.

## 2025-01-28 NOTE — TELEPHONE ENCOUNTER
Yolanda from [facility] called to report at 1157 today pt had new onset of atrial fibrillation, this meets their urgent criteria. This is first occurrence since being on monitor, she baselined yesterday w/ NSS.     Advised I would make provider aware.

## 2025-01-31 ENCOUNTER — DOCUMENTATION (OUTPATIENT)
Dept: CARDIOLOGY CLINIC | Facility: CLINIC | Age: 65
End: 2025-01-31

## 2025-01-31 NOTE — PROGRESS NOTES
[facility]      Current Outpatient Medications:   •  acetaminophen (TYLENOL) 500 mg tablet, Take 1,000 mg by mouth every 6 (six) hours as needed for mild pain, Disp: , Rfl:   •  apixaban (Eliquis) 5 mg, Take 1 tablet (5 mg total) by mouth 2 (two) times a day, Disp: 60 tablet, Rfl: 6  •  aspirin 81 mg chewable tablet, Chew 81 mg daily, Disp: , Rfl:   •  ciclopirox (PENLAC) 8 % solution, Apply topically daily at bedtime, Disp: 6 mL, Rfl: 11  •  meloxicam (MOBIC) 15 mg tablet, Take 1 tablet (15 mg total) by mouth daily, Disp: 90 tablet, Rfl: 3  •  metoprolol succinate (TOPROL-XL) 25 mg 24 hr tablet, Take 1 tablet (25 mg total) by mouth 2 (two) times a day, Disp: 60 tablet, Rfl: 6

## 2025-02-04 ENCOUNTER — APPOINTMENT (OUTPATIENT)
Dept: LAB | Facility: CLINIC | Age: 65
End: 2025-02-04
Payer: COMMERCIAL

## 2025-02-04 ENCOUNTER — RESULTS FOLLOW-UP (OUTPATIENT)
Dept: CARDIOLOGY CLINIC | Facility: CLINIC | Age: 65
End: 2025-02-04

## 2025-02-04 DIAGNOSIS — Z13.220 SCREENING CHOLESTEROL LEVEL: ICD-10-CM

## 2025-02-04 DIAGNOSIS — E66.01 CLASS 3 SEVERE OBESITY DUE TO EXCESS CALORIES WITH SERIOUS COMORBIDITY AND BODY MASS INDEX (BMI) OF 40.0 TO 44.9 IN ADULT (HCC): ICD-10-CM

## 2025-02-04 DIAGNOSIS — R79.89 ABNORMAL CBC: ICD-10-CM

## 2025-02-04 DIAGNOSIS — I10 PRIMARY HYPERTENSION: ICD-10-CM

## 2025-02-04 DIAGNOSIS — G47.33 OBSTRUCTIVE SLEEP APNEA TREATED WITH CONTINUOUS POSITIVE AIRWAY PRESSURE (CPAP): ICD-10-CM

## 2025-02-04 DIAGNOSIS — E66.813 CLASS 3 SEVERE OBESITY DUE TO EXCESS CALORIES WITH SERIOUS COMORBIDITY AND BODY MASS INDEX (BMI) OF 40.0 TO 44.9 IN ADULT (HCC): ICD-10-CM

## 2025-02-04 DIAGNOSIS — R03.0 PRE-HYPERTENSION: ICD-10-CM

## 2025-02-04 DIAGNOSIS — G47.61 PLMD (PERIODIC LIMB MOVEMENT DISORDER): ICD-10-CM

## 2025-02-04 DIAGNOSIS — I48.0 PAROXYSMAL ATRIAL FIBRILLATION (HCC): ICD-10-CM

## 2025-02-04 LAB
ALBUMIN SERPL BCG-MCNC: 3.8 G/DL (ref 3.5–5)
ALP SERPL-CCNC: 82 U/L (ref 34–104)
ALT SERPL W P-5'-P-CCNC: 56 U/L (ref 7–52)
ANION GAP SERPL CALCULATED.3IONS-SCNC: 10 MMOL/L (ref 4–13)
AST SERPL W P-5'-P-CCNC: 39 U/L (ref 13–39)
BASOPHILS # BLD MANUAL: 0.17 THOUSAND/UL (ref 0–0.1)
BASOPHILS NFR MAR MANUAL: 2 % (ref 0–1)
BILIRUB SERPL-MCNC: 0.71 MG/DL (ref 0.2–1)
BUN SERPL-MCNC: 17 MG/DL (ref 5–25)
CALCIUM SERPL-MCNC: 8.6 MG/DL (ref 8.4–10.2)
CHLORIDE SERPL-SCNC: 106 MMOL/L (ref 96–108)
CHOLEST SERPL-MCNC: 126 MG/DL (ref ?–200)
CO2 SERPL-SCNC: 25 MMOL/L (ref 21–32)
CREAT SERPL-MCNC: 0.58 MG/DL (ref 0.6–1.3)
DIFFERENTIAL COMMENT: ABNORMAL
EOSINOPHIL # BLD MANUAL: 0.08 THOUSAND/UL (ref 0–0.4)
EOSINOPHIL NFR BLD MANUAL: 1 % (ref 0–6)
ERYTHROCYTE [DISTWIDTH] IN BLOOD BY AUTOMATED COUNT: 14 % (ref 11.6–15.1)
FERRITIN SERPL-MCNC: 230 NG/ML (ref 11–307)
GFR SERPL CREATININE-BSD FRML MDRD: 96 ML/MIN/1.73SQ M
GLUCOSE P FAST SERPL-MCNC: 92 MG/DL (ref 65–99)
HCT VFR BLD AUTO: 44.7 % (ref 34.8–46.1)
HDLC SERPL-MCNC: 33 MG/DL
HGB BLD-MCNC: 14.5 G/DL (ref 11.5–15.4)
IRON SATN MFR SERPL: 19 % (ref 15–50)
IRON SERPL-MCNC: 60 UG/DL (ref 50–212)
LDLC SERPL CALC-MCNC: 71 MG/DL (ref 0–100)
LYMPHOCYTES # BLD AUTO: 14 % (ref 14–44)
LYMPHOCYTES # BLD AUTO: 2.91 THOUSAND/UL (ref 0.6–4.47)
MAGNESIUM SERPL-MCNC: 2.1 MG/DL (ref 1.9–2.7)
MCH RBC QN AUTO: 29.2 PG (ref 26.8–34.3)
MCHC RBC AUTO-ENTMCNC: 32.4 G/DL (ref 31.4–37.4)
MCV RBC AUTO: 90 FL (ref 82–98)
MONOCYTES # BLD AUTO: 0.75 THOUSAND/UL (ref 0–1.22)
MONOCYTES NFR BLD: 9 % (ref 4–12)
NEUTROPHILS # BLD MANUAL: 4.4 THOUSAND/UL (ref 1.85–7.62)
NEUTS BAND NFR BLD MANUAL: 3 % (ref 0–8)
NEUTS SEG NFR BLD AUTO: 50 % (ref 43–75)
NONHDLC SERPL-MCNC: 93 MG/DL
PATHOLOGY REVIEW: YES
PLATELET # BLD AUTO: 183 THOUSANDS/UL (ref 149–390)
PLATELET BLD QL SMEAR: ADEQUATE
PMV BLD AUTO: 9.6 FL (ref 8.9–12.7)
POTASSIUM SERPL-SCNC: 4.2 MMOL/L (ref 3.5–5.3)
PROT SERPL-MCNC: 6.5 G/DL (ref 6.4–8.4)
RBC # BLD AUTO: 4.97 MILLION/UL (ref 3.81–5.12)
RBC MORPH BLD: NORMAL
SODIUM SERPL-SCNC: 141 MMOL/L (ref 135–147)
TIBC SERPL-MCNC: 312.2 UG/DL (ref 250–450)
TRANSFERRIN SERPL-MCNC: 223 MG/DL (ref 203–362)
TRIGL SERPL-MCNC: 108 MG/DL (ref ?–150)
TSH SERPL DL<=0.05 MIU/L-ACNC: 2.49 UIU/ML (ref 0.45–4.5)
UIBC SERPL-MCNC: 252 UG/DL (ref 155–355)
VARIANT LYMPHS # BLD AUTO: 21 %
VIT B12 SERPL-MCNC: 484 PG/ML (ref 180–914)
WBC # BLD AUTO: 8.3 THOUSAND/UL (ref 4.31–10.16)

## 2025-02-04 PROCEDURE — 82607 VITAMIN B-12: CPT

## 2025-02-04 PROCEDURE — 83550 IRON BINDING TEST: CPT

## 2025-02-04 PROCEDURE — 85060 BLOOD SMEAR INTERPRETATION: CPT | Performed by: PATHOLOGY

## 2025-02-04 PROCEDURE — 84443 ASSAY THYROID STIM HORMONE: CPT

## 2025-02-04 PROCEDURE — 83735 ASSAY OF MAGNESIUM: CPT

## 2025-02-04 PROCEDURE — 82728 ASSAY OF FERRITIN: CPT

## 2025-02-04 PROCEDURE — 80061 LIPID PANEL: CPT

## 2025-02-04 PROCEDURE — 85027 COMPLETE CBC AUTOMATED: CPT

## 2025-02-04 PROCEDURE — 85007 BL SMEAR W/DIFF WBC COUNT: CPT

## 2025-02-04 PROCEDURE — 83540 ASSAY OF IRON: CPT

## 2025-02-04 PROCEDURE — 36415 COLL VENOUS BLD VENIPUNCTURE: CPT

## 2025-02-04 PROCEDURE — 80053 COMPREHEN METABOLIC PANEL: CPT

## 2025-02-07 ENCOUNTER — PATIENT MESSAGE (OUTPATIENT)
Dept: FAMILY MEDICINE CLINIC | Facility: CLINIC | Age: 65
End: 2025-02-07

## 2025-02-07 DIAGNOSIS — J06.9 BACTERIAL URI: Primary | ICD-10-CM

## 2025-02-07 DIAGNOSIS — B96.89 BACTERIAL URI: Primary | ICD-10-CM

## 2025-02-07 RX ORDER — DEXTROMETHORPHAN HYDROBROMIDE AND PROMETHAZINE HYDROCHLORIDE 15; 6.25 MG/5ML; MG/5ML
5 SYRUP ORAL 4 TIMES DAILY PRN
Qty: 240 ML | Refills: 1 | Status: SHIPPED | OUTPATIENT
Start: 2025-02-07

## 2025-02-11 ENCOUNTER — OFFICE VISIT (OUTPATIENT)
Dept: FAMILY MEDICINE CLINIC | Facility: CLINIC | Age: 65
End: 2025-02-11
Payer: COMMERCIAL

## 2025-02-11 VITALS
DIASTOLIC BLOOD PRESSURE: 80 MMHG | HEIGHT: 66 IN | SYSTOLIC BLOOD PRESSURE: 138 MMHG | RESPIRATION RATE: 18 BRPM | WEIGHT: 256 LBS | TEMPERATURE: 96.8 F | HEART RATE: 66 BPM | BODY MASS INDEX: 41.14 KG/M2 | OXYGEN SATURATION: 98 %

## 2025-02-11 DIAGNOSIS — R79.89 ABNORMAL CBC: ICD-10-CM

## 2025-02-11 DIAGNOSIS — Z12.31 ENCOUNTER FOR SCREENING MAMMOGRAM FOR BREAST CANCER: ICD-10-CM

## 2025-02-11 DIAGNOSIS — E55.9 VITAMIN D DEFICIENCY: ICD-10-CM

## 2025-02-11 DIAGNOSIS — J01.01 ACUTE RECURRENT MAXILLARY SINUSITIS: ICD-10-CM

## 2025-02-11 DIAGNOSIS — Z28.21 PNEUMOCOCCAL VACCINATION DECLINED: ICD-10-CM

## 2025-02-11 DIAGNOSIS — Z00.00 ANNUAL PHYSICAL EXAM: Primary | ICD-10-CM

## 2025-02-11 DIAGNOSIS — I10 PRIMARY HYPERTENSION: ICD-10-CM

## 2025-02-11 DIAGNOSIS — I48.0 PAROXYSMAL ATRIAL FIBRILLATION (HCC): ICD-10-CM

## 2025-02-11 DIAGNOSIS — Z13.220 SCREENING CHOLESTEROL LEVEL: ICD-10-CM

## 2025-02-11 DIAGNOSIS — R79.89 ABNORMAL LIVER FUNCTION TEST: ICD-10-CM

## 2025-02-11 PROCEDURE — 99214 OFFICE O/P EST MOD 30 MIN: CPT | Performed by: NURSE PRACTITIONER

## 2025-02-11 PROCEDURE — 99397 PER PM REEVAL EST PAT 65+ YR: CPT | Performed by: NURSE PRACTITIONER

## 2025-02-11 RX ORDER — PREDNISONE 20 MG/1
20 TABLET ORAL 2 TIMES DAILY WITH MEALS
Qty: 10 TABLET | Refills: 0 | Status: SHIPPED | OUTPATIENT
Start: 2025-02-11 | End: 2025-02-16

## 2025-02-11 RX ORDER — VIT C/B6/B5/MAGNESIUM/HERB 173 50-5-6-5MG
1000 CAPSULE ORAL DAILY
COMMUNITY

## 2025-02-11 RX ORDER — AZITHROMYCIN 250 MG/1
TABLET, FILM COATED ORAL
Qty: 6 TABLET | Refills: 0 | Status: SHIPPED | OUTPATIENT
Start: 2025-02-11 | End: 2025-02-15

## 2025-02-11 NOTE — PROGRESS NOTES
Adult Annual Physical  Name: Makayla Ruano      : 1960      MRN: [id number]  Encounter Provider: ALICIA Benson  Encounter Date: 2025   Encounter department: [facility]    Assessment & Plan  Encounter for screening mammogram for breast cancer    Orders:    Mammo screening bilateral w 3d and cad; Future    Abnormal CBC    Orders:    Comprehensive metabolic panel; Future    Abnormal liver function test    Orders:    CBC and differential; Future    Acute recurrent maxillary sinusitis    Orders:    azithromycin (ZITHROMAX) 250 mg tablet; Take 2 tablets today then 1 tablet daily x 4 days    predniSONE 20 mg tablet; Take 1 tablet (20 mg total) by mouth 2 (two) times a day with meals for 5 days    Annual physical exam    Orders:    Comprehensive metabolic panel; Future    CBC and differential; Future    Screening cholesterol level    Orders:    Lipid panel; Future    Vitamin D deficiency    Orders:    Vitamin D 25 hydroxy; Future    Paroxysmal atrial fibrillation (HCC)         Primary hypertension         Pneumococcal vaccination declined         Immunizations and preventive care screenings were discussed with patient today. Appropriate education was printed on patient's after visit summary.          Depression Screening and Follow-up Plan: Patient was screened for depression during today's encounter. They screened negative with a PHQ-2 score of 0.          History of Present Illness     Adult Annual Physical:  Patient presents for annual physical. Here for annual physical  Continues with sinus congestion since 2025, last year she had chronic congestion from October-April. Has not had antibiotic for this current episode, will consider allergy/ENT consult if continues.   Following closely with Cardiology for irregular/fast heart rate. Has been diagnosed with PAF and has been taking Metoprolol and Eliquis  Reviewed recent bloodwork results- will recheck CMP and CBC in 4 weeks  "for slightly abnormal results.     Depression Screening:  - PHQ-2 Score: 0    Review of Systems   Constitutional:  Positive for fatigue. Negative for activity change, appetite change and fever.   HENT:  Positive for congestion, postnasal drip, rhinorrhea, sinus pressure and sinus pain. Negative for sore throat.    Eyes:  Negative for pain, discharge and visual disturbance.   Respiratory:  Positive for cough. Negative for chest tightness, shortness of breath and wheezing.    Cardiovascular:  Positive for palpitations (following closely with Cardiology- started on medication for A Fib). Negative for chest pain and leg swelling.   Gastrointestinal:  Negative for abdominal distention, abdominal pain, constipation, diarrhea and nausea.   Endocrine: Negative for polydipsia, polyphagia and polyuria.   Genitourinary:  Negative for difficulty urinating, dysuria, frequency and urgency.   Musculoskeletal:  Negative for back pain, gait problem, joint swelling, myalgias and neck stiffness.   Skin:  Negative for color change.   Neurological:  Negative for dizziness, syncope, speech difficulty, weakness and headaches.   Hematological:  Negative for adenopathy. Does not bruise/bleed easily.   Psychiatric/Behavioral:  Negative for agitation, behavioral problems, confusion and hallucinations. The patient is not nervous/anxious.      Medical History Reviewed by provider this encounter:  Tobacco  Allergies  Meds  Problems  Med Hx  Surg Hx  Fam Hx     .    Objective   /80   Pulse 66   Temp (!) 96.8 °F (36 °C)   Resp 18   Ht 5' 6\" (1.676 m)   Wt 116 kg (256 lb)   SpO2 98%   BMI 41.32 kg/m²     Physical Exam  Vitals and nursing note reviewed.   Constitutional:       General: She is not in acute distress.     Appearance: She is well-developed. She is not diaphoretic.   HENT:      Head: Normocephalic and atraumatic.      Right Ear: Tympanic membrane, ear canal and external ear normal.      Left Ear: Tympanic membrane, " ear canal and external ear normal.      Nose: Congestion and rhinorrhea present.      Mouth/Throat:      Mouth: Mucous membranes are moist.      Pharynx: Oropharynx is clear. Uvula midline.   Eyes:      General:         Right eye: No discharge.         Left eye: No discharge.      Conjunctiva/sclera: Conjunctivae normal.   Neck:      Thyroid: No thyromegaly.   Cardiovascular:      Rate and Rhythm: Normal rate and regular rhythm.      Heart sounds: Normal heart sounds. No murmur heard.     No friction rub. No gallop.   Pulmonary:      Effort: Pulmonary effort is normal. No respiratory distress.      Breath sounds: Normal breath sounds. No wheezing or rales.   Musculoskeletal:         General: No tenderness or deformity. Normal range of motion.      Cervical back: Normal range of motion and neck supple.   Skin:     General: Skin is warm and dry.      Findings: No erythema or rash.   Neurological:      Mental Status: She is alert and oriented to person, place, and time.      Cranial Nerves: No cranial nerve deficit.      Coordination: Coordination normal.   Psychiatric:         Mood and Affect: Mood normal.         Behavior: Behavior normal.         Thought Content: Thought content normal.         Judgment: Judgment normal.

## 2025-02-14 ENCOUNTER — RESULTS FOLLOW-UP (OUTPATIENT)
Dept: SLEEP CENTER | Facility: CLINIC | Age: 65
End: 2025-02-14

## 2025-02-21 DIAGNOSIS — M19.90 ARTHRITIS: ICD-10-CM

## 2025-02-21 RX ORDER — MELOXICAM 15 MG/1
15 TABLET ORAL DAILY
Qty: 90 TABLET | Refills: 1 | Status: SHIPPED | OUTPATIENT
Start: 2025-02-21

## 2025-03-04 ENCOUNTER — CLINICAL SUPPORT (OUTPATIENT)
Dept: CARDIOLOGY CLINIC | Facility: CLINIC | Age: 65
End: 2025-03-04
Payer: COMMERCIAL

## 2025-03-04 DIAGNOSIS — E66.813 CLASS 3 SEVERE OBESITY DUE TO EXCESS CALORIES WITH SERIOUS COMORBIDITY AND BODY MASS INDEX (BMI) OF 40.0 TO 44.9 IN ADULT (HCC): ICD-10-CM

## 2025-03-04 DIAGNOSIS — G47.33 OBSTRUCTIVE SLEEP APNEA TREATED WITH CONTINUOUS POSITIVE AIRWAY PRESSURE (CPAP): ICD-10-CM

## 2025-03-04 DIAGNOSIS — E66.01 CLASS 3 SEVERE OBESITY DUE TO EXCESS CALORIES WITH SERIOUS COMORBIDITY AND BODY MASS INDEX (BMI) OF 40.0 TO 44.9 IN ADULT (HCC): ICD-10-CM

## 2025-03-04 DIAGNOSIS — I10 PRIMARY HYPERTENSION: ICD-10-CM

## 2025-03-04 DIAGNOSIS — I48.0 PAROXYSMAL ATRIAL FIBRILLATION (HCC): ICD-10-CM

## 2025-03-05 ENCOUNTER — TELEPHONE (OUTPATIENT)
Dept: CARDIOLOGY CLINIC | Facility: CLINIC | Age: 65
End: 2025-03-05

## 2025-03-05 ENCOUNTER — HOSPITAL ENCOUNTER (OUTPATIENT)
Dept: MAMMOGRAPHY | Facility: HOSPITAL | Age: 65
Discharge: HOME | End: 2025-03-05
Payer: COMMERCIAL

## 2025-03-05 ENCOUNTER — PATIENT MESSAGE (OUTPATIENT)
Dept: CARDIOLOGY CLINIC | Facility: CLINIC | Age: 65
End: 2025-03-05

## 2025-03-05 ENCOUNTER — HOSPITAL ENCOUNTER (OUTPATIENT)
Dept: BONE DENSITY | Facility: HOSPITAL | Age: 65
Discharge: HOME | End: 2025-03-05
Payer: COMMERCIAL

## 2025-03-05 VITALS — BODY MASS INDEX: 41.78 KG/M2 | WEIGHT: 260 LBS | HEIGHT: 66 IN

## 2025-03-05 DIAGNOSIS — Z13.820 SCREENING FOR OSTEOPOROSIS: ICD-10-CM

## 2025-03-05 DIAGNOSIS — Z12.31 ENCOUNTER FOR SCREENING MAMMOGRAM FOR BREAST CANCER: ICD-10-CM

## 2025-03-05 PROCEDURE — 93228 REMOTE 30 DAY ECG REV/REPORT: CPT | Performed by: INTERNAL MEDICINE

## 2025-03-05 PROCEDURE — 77080 DXA BONE DENSITY AXIAL: CPT

## 2025-03-05 PROCEDURE — 77067 SCR MAMMO BI INCL CAD: CPT

## 2025-03-05 PROCEDURE — 77063 BREAST TOMOSYNTHESIS BI: CPT

## 2025-03-05 NOTE — TELEPHONE ENCOUNTER
-Attempted to call patient to discuss elevated heart rate.  Unfortunately was unable to reach the patient but I did leave a message on her phone with my name and office number to call back for a better time to speak.  Also in that message I recommended that if she were to have persistently elevated heart rate or worsening symptomatology she should seek emergency medical care immediately.  I again left my office number.

## 2025-03-05 NOTE — TELEPHONE ENCOUNTER
-I was able to call and speak with patient about ambulatory event monitor results.  I informed her that overall atrial fibrillation burden was low accounting for less than 1% of total monitored beats and after initiation of medical therapy which would have been after her episodes documented on 1/28/2025 there was only 1 other day when atrial fibrillation was documented.  It does appear by event monitor results that atrial fibrillation on that day average A-fib heart rate was about 60 bpm which is much better controlled than previous.  She denies any issues with bleeding on current medical therapy and is overall doing well.  We will follow-up echocardiogram results with additional recommendations pending.  Did discuss with patient about potential for implantable loop recorder however at this time she was to think about it and will let us know.

## 2025-03-06 ENCOUNTER — RESULTS FOLLOW-UP (OUTPATIENT)
Dept: FAMILY MEDICINE CLINIC | Facility: CLINIC | Age: 65
End: 2025-03-06

## 2025-03-06 NOTE — TELEPHONE ENCOUNTER
Pt called back; she is feeling better today, HR is currently 63 bpm. Yesterday's episode lasted for about 3 hours., HR went as high as 146. Pt is starting to contribute the AFIB episodes to stress.     Pt is scheduled for an ECHO on 3/10 and then a f/u with Dr. Daly on 4/10.     Advised pt to seek emergency care should she have any further episodes of continued AFIB with symptoms of SOB, dizziness, lightheadedness, chest pain or fatigue.     Advised the patient would inform the provider that she called back and would call her back if he had any recommendations prior to her f/u apt.

## 2025-03-07 ENCOUNTER — RESULTS FOLLOW-UP (OUTPATIENT)
Dept: FAMILY MEDICINE CLINIC | Facility: CLINIC | Age: 65
End: 2025-03-07

## 2025-03-11 ENCOUNTER — HOSPITAL ENCOUNTER (OUTPATIENT)
Dept: NON INVASIVE DIAGNOSTICS | Facility: HOSPITAL | Age: 65
Discharge: HOME | End: 2025-03-11
Attending: INTERNAL MEDICINE
Payer: COMMERCIAL

## 2025-03-11 VITALS
BODY MASS INDEX: 41.78 KG/M2 | DIASTOLIC BLOOD PRESSURE: 80 MMHG | HEIGHT: 66 IN | HEART RATE: 67 BPM | SYSTOLIC BLOOD PRESSURE: 138 MMHG | WEIGHT: 260 LBS

## 2025-03-11 DIAGNOSIS — E66.813 CLASS 3 SEVERE OBESITY DUE TO EXCESS CALORIES WITH SERIOUS COMORBIDITY AND BODY MASS INDEX (BMI) OF 40.0 TO 44.9 IN ADULT (HCC): ICD-10-CM

## 2025-03-11 DIAGNOSIS — G47.33 OBSTRUCTIVE SLEEP APNEA TREATED WITH CONTINUOUS POSITIVE AIRWAY PRESSURE (CPAP): ICD-10-CM

## 2025-03-11 DIAGNOSIS — E66.01 CLASS 3 SEVERE OBESITY DUE TO EXCESS CALORIES WITH SERIOUS COMORBIDITY AND BODY MASS INDEX (BMI) OF 40.0 TO 44.9 IN ADULT (HCC): ICD-10-CM

## 2025-03-11 DIAGNOSIS — I10 PRIMARY HYPERTENSION: ICD-10-CM

## 2025-03-11 DIAGNOSIS — I48.0 PAROXYSMAL ATRIAL FIBRILLATION (HCC): ICD-10-CM

## 2025-03-11 LAB
AORTIC ROOT: 2.8 CM
AORTIC VALVE MEAN VELOCITY: 9.2 M/S
ASCENDING AORTA: 3.2 CM
AV AREA BY CONTINUOUS VTI: 2.6 CM2
AV AREA PEAK VELOCITY: 2 CM2
AV LVOT MEAN GRADIENT: 2 MMHG
AV LVOT PEAK GRADIENT: 5 MMHG
AV MEAN PRESS GRAD SYS DOP V1V2: 4 MMHG
AV ORIFICE AREA US: 2.62 CM2
AV PEAK GRADIENT: 11 MMHG
AV REGURGITATION PRESSURE HALF TIME: 968 MS
AV VELOCITY RATIO: 0.84
AV VMAX SYS DOP: 1.69 M/S
BSA FOR ECHO PROCEDURE: 2.24 M2
DOP CALC AO VTI: 35.41 CM
DOP CALC LVOT AREA: 3.14 CM2
DOP CALC LVOT CARDIAC INDEX: 2.45 L/MIN/M2
DOP CALC LVOT CARDIAC OUTPUT: 5.48 L/MIN
DOP CALC LVOT DIAMETER: 2 CM
DOP CALC LVOT PEAK VEL VTI: 29.6 CM
DOP CALC LVOT PEAK VEL: 1.09 M/S
DOP CALC LVOT STROKE INDEX: 42.4 ML/M2
DOP CALC LVOT STROKE VOLUME: 95
E WAVE DECELERATION TIME: 239 MS
E/A RATIO: 1.12
FRACTIONAL SHORTENING: 34 (ref 28–44)
INTERVENTRICULAR SEPTUM IN DIASTOLE (PARASTERNAL SHORT AXIS VIEW): 1 CM
INTERVENTRICULAR SEPTUM: 1 CM (ref 0.6–1.1)
LAAS-AP2: 19.3 CM2
LAAS-AP4: 19.5 CM2
LEFT ATRIUM SIZE: 3.4 CM
LEFT ATRIUM VOLUME (MOD BIPLANE): 54 ML
LEFT ATRIUM VOLUME INDEX (MOD BIPLANE): 24.1 ML/M2
LEFT INTERNAL DIMENSION IN SYSTOLE: 3.1 CM (ref 2.1–4)
LEFT VENTRICULAR INTERNAL DIMENSION IN DIASTOLE: 4.7 CM (ref 3.5–6)
LEFT VENTRICULAR POSTERIOR WALL IN END DIASTOLE: 0.9 CM
LEFT VENTRICULAR STROKE VOLUME: 66 ML
LV EF US.2D.A4C+ESTIMATED: 65 %
LVSV (TEICH): 66 ML
MV E'TISSUE VEL-SEP: 14 CM/S
MV PEAK A VEL: 0.81 M/S
MV PEAK E VEL: 91 CM/S
MV STENOSIS PRESSURE HALF TIME: 69 MS
MV VALVE AREA P 1/2 METHOD: 3.2
RA PRESSURE ESTIMATED: 8 MMHG
RIGHT ATRIUM AREA SYSTOLE A4C: 12.4 CM2
RIGHT VENTRICLE ID DIMENSION: 3 CM
RV PSP: 34 MMHG
SL CV AV DECELERATION TIME RETROGRADE: 3338 MS
SL CV AV PEAK GRADIENT RETROGRADE: 39 MMHG
SL CV LEFT ATRIUM LENGTH A2C: 5.5 CM
SL CV LV EF: 60
SL CV PED ECHO LEFT VENTRICLE DIASTOLIC VOLUME (MOD BIPLANE) 2D: 103 ML
SL CV PED ECHO LEFT VENTRICLE SYSTOLIC VOLUME (MOD BIPLANE) 2D: 38 ML
TR MAX PG: 26 MMHG
TR PEAK VELOCITY: 2.6 M/S
TRICUSPID ANNULAR PLANE SYSTOLIC EXCURSION: 2.5 CM
TRICUSPID VALVE PEAK REGURGITATION VELOCITY: 2.56 M/S

## 2025-03-11 PROCEDURE — 93303 ECHO TRANSTHORACIC: CPT | Performed by: INTERNAL MEDICINE

## 2025-03-11 PROCEDURE — 93303 ECHO TRANSTHORACIC: CPT

## 2025-04-10 ENCOUNTER — OFFICE VISIT (OUTPATIENT)
Dept: CARDIOLOGY CLINIC | Facility: CLINIC | Age: 65
End: 2025-04-10
Payer: COMMERCIAL

## 2025-04-10 ENCOUNTER — OFFICE VISIT (OUTPATIENT)
Dept: BARIATRICS | Facility: CLINIC | Age: 65
End: 2025-04-10
Payer: COMMERCIAL

## 2025-04-10 VITALS
HEART RATE: 77 BPM | SYSTOLIC BLOOD PRESSURE: 124 MMHG | HEIGHT: 66 IN | BODY MASS INDEX: 42.27 KG/M2 | RESPIRATION RATE: 18 BRPM | WEIGHT: 263 LBS | OXYGEN SATURATION: 97 % | DIASTOLIC BLOOD PRESSURE: 64 MMHG | TEMPERATURE: 97.6 F

## 2025-04-10 VITALS
BODY MASS INDEX: 44.56 KG/M2 | SYSTOLIC BLOOD PRESSURE: 122 MMHG | TEMPERATURE: 98.6 F | RESPIRATION RATE: 18 BRPM | DIASTOLIC BLOOD PRESSURE: 78 MMHG | HEART RATE: 60 BPM | HEIGHT: 64 IN | OXYGEN SATURATION: 97 % | WEIGHT: 261 LBS

## 2025-04-10 DIAGNOSIS — I48.0 PAROXYSMAL ATRIAL FIBRILLATION (HCC): ICD-10-CM

## 2025-04-10 DIAGNOSIS — I10 PRIMARY HYPERTENSION: ICD-10-CM

## 2025-04-10 DIAGNOSIS — I10 PRIMARY HYPERTENSION: Primary | ICD-10-CM

## 2025-04-10 DIAGNOSIS — G47.33 OBSTRUCTIVE SLEEP APNEA TREATED WITH CONTINUOUS POSITIVE AIRWAY PRESSURE (CPAP): Primary | ICD-10-CM

## 2025-04-10 DIAGNOSIS — E66.813 CLASS 3 SEVERE OBESITY DUE TO EXCESS CALORIES WITH SERIOUS COMORBIDITY AND BODY MASS INDEX (BMI) OF 40.0 TO 44.9 IN ADULT: ICD-10-CM

## 2025-04-10 DIAGNOSIS — I36.1 NONRHEUMATIC TRICUSPID VALVE REGURGITATION: ICD-10-CM

## 2025-04-10 DIAGNOSIS — G47.33 OBSTRUCTIVE SLEEP APNEA TREATED WITH CONTINUOUS POSITIVE AIRWAY PRESSURE (CPAP): ICD-10-CM

## 2025-04-10 PROCEDURE — 99214 OFFICE O/P EST MOD 30 MIN: CPT | Performed by: INTERNAL MEDICINE

## 2025-04-10 PROCEDURE — 99204 OFFICE O/P NEW MOD 45 MIN: CPT | Performed by: PHYSICIAN ASSISTANT

## 2025-04-10 RX ORDER — TIRZEPATIDE 2.5 MG/.5ML
2.5 INJECTION, SOLUTION SUBCUTANEOUS WEEKLY
Qty: 2 ML | Refills: 0 | Status: CANCELLED | OUTPATIENT
Start: 2025-04-10 | End: 2025-05-08

## 2025-04-10 NOTE — PROGRESS NOTES
Assessment/Plan:    Makayla Ruano  is a 65 y.o. female with excess weight/obesity here to pursue weight managment.      Obese  -- Discussed options of HealthyCORE-Intensive Lifestyle Intervention Program, Very Low Calorie Diet-VLCD, Conservative Program, Corinna-En-Y Gastric Bypass, and Vertical Sleeve Gastrectomy and the role of weight loss medications.  - Initial weight loss goal of 5-10% weight loss for improved health  - Patient is interested in pursuing Conservative Program  - Screening labs: Ordered A1C and fasting insulin in addition to already ordered labs  - Calorie goal: 3868-8653 calories per day  - Start diet and exercise  - Discussed option of Zepbound 2.5mg weekly due to SHIVA and obesity, patient prefers to wait   - Phentermine contraindicated due to age and cardiac history, avoid topiramate due to hx kidney stones  - Medication agreement signed: No  - Follow up in 2 months  - Dietician: Agreeable in future       Obstructive sleep apnea treated with continuous positive airway pressure (CPAP)    - Hoping to improve with weight loss  - Compliant with CPAP        Goals/General Recommendations:     Food log (ie.) [email or web address],[email or web address], Riplit.com, [email or web address], etc.   Practice mindful eating.  Be sure to set aside time to eat, eat slowly, and savor your food.  Do NOT drink your calories and aim for AT LEAST 64oz of water daily. No sugar sweetened beverages.  No juice (eat the fruit instead).  Eat breakfast daily.  Do not skip meals.    Exercise:   Increase physical activity by 10 minutes daily. Gradually increase physical activity to a goal of 5 days per week for 30 minutes of MODERATE intensity PLUS 2 days per week of FULL BODY resistance training. Resistance training can increase muscle mass and increase our resting metabolic rate.   FULL BODY resistance training is recommended 2-3 times a week.  Do not do this on consecutive days to allow for muscle recovery.  Aim for a bare minimum  8-10,000 steps, even on days you do not exercise.     Monitoring:   Weigh yourself daily.  If this causes undue stress, then just weigh yourself once a week.  Weigh yourself the same time of the day with the same amount of clothing on.  Preferably this should be done after waking up, before you eat, and with no clothing or minimal clothing on.      Diagnoses and all orders for this visit:    Obstructive sleep apnea treated with continuous positive airway pressure (CPAP)  -     Ambulatory Referral to Weight Management    Paroxysmal atrial fibrillation (HCC)  -     Ambulatory Referral to Weight Management    Primary hypertension  -     Ambulatory Referral to Weight Management    Class 3 severe obesity due to excess calories with serious comorbidity and body mass index (BMI) of 40.0 to 44.9 in adult (HCC)  -     Ambulatory Referral to Weight Management        Food log (ie.) [email or web address],[email or web address],[email or web address],[email or web address],etc.   No sugary beverages. At least 64oz of water daily.  Goal protein intake of 60-80 grams per day  25-35 grams of dietary fiber per day  3829-1303 calories per day    Follow up in approximately 2 months with Surgical Advanced Practitioner.    - Discussed options of HealthyCORE-Intensive Lifestyle Intervention Program, Very Low Calorie Diet-VLCD, Conservative Program, Corinna-En-Y Gastric Bypass, and Vertical Sleeve Gastrectomy and the role of weight loss medications.  - Explained the importance of making lifestyle changes first before starting anti-obesity medications.  - Patient is interested in pursuing Conservative Program  - Initial weight loss goal of 5-10% weight loss for improved health as studies have shown this is where we see the greatest impact on improving health and decreasing risk of obesity related conditions.  - Weight loss can improve patient's co-morbid conditions and/or prevent weight-related complications.  - Stop Bang: hx of sleep apnea on CPAP  - Labs reviewed:  Ordered A1C and fasting insulin to get in addition to labs already ordered        Subjective:     Patient ID: Makayla Ruano  is a 65 y.o. female with excess weight/obesity here to pursue weight managment.    Past Medical History:   Diagnosis Date    Allergic     Whole Life    Arthritis 2022    X-ray    Dizziness     Kidney stone 2005    Obesity 2002    Palpitations     SOB (shortness of breath)     Tachycardia        HPI:    Severity: Morbid  Onset:  Adulthood, 20 years    Highest weight: 261 (current)  Lowest Weight: 118 lb  Current weight: 261  Goal weight: 200  What has been tried: Diet and Exercise  Contributing factors: Poor Food Choices, Insufficient Physical Activity, and knee injury  Associated symptoms and effects: fatigue, increased joint pain, decreased self esteem, and increased shortness of breath   5% weight loss = 13 lb  20% weight loss = 52 lb    Wt Readings from Last 20 Encounters:   04/10/25 118 kg (261 lb)   04/10/25 119 kg (263 lb)   03/11/25 118 kg (260 lb)   03/05/25 118 kg (260 lb)   02/11/25 116 kg (256 lb)   01/22/25 121 kg (266 lb 9.6 oz)   11/13/24 122 kg (269 lb)   08/12/24 120 kg (265 lb 6.4 oz)   08/08/24 119 kg (261 lb 12.8 oz)   07/30/24 119 kg (263 lb 3.2 oz)   06/30/24 121 kg (266 lb)   06/28/24 122 kg (268 lb)   06/14/24 121 kg (266 lb)   06/13/24 118 kg (260 lb)   01/30/24 120 kg (265 lb)   11/27/23 122 kg (269 lb)   10/11/23 121 kg (266 lb)   09/29/23 120 kg (264 lb)   02/22/23 119 kg (262 lb)   02/01/23 119 kg (262 lb 5.6 oz)        Food logging:  B: Breakfast burrito + small glass of prune juice, no coffee  S: Skips  L: [place] Meat + cheese or salad   S: Cakes, pies, sweets  D: Crackers and hummus or leftovers or pasta or meat and potatoes  S: Cake     Dining out: 3 times a week  Hydration: 64 oz, no soda, no iced tea, prune juice, no other fruit juices  Alcohol: Occasional whiskey  Smoking: Denies  Exercise: Denies  Weight Monitoring: Denies  Sleep: 7-8 hours  Occupation:  "Retired, 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  Denies any hx of glaucoma, seizures, gallstones. Hx kidney stone  Denies Hx of CAD, PAD, Hx of afib  Denies uncontrolled anxiety or depression, suicidal behavior or thinking, insomnia or sleep disturbance.       The following portions of the patient's history were reviewed and updated as appropriate: allergies, current medications, past family history, past medical history, past social history, past surgical history, and problem list.    Review of Systems   Constitutional:  Negative for chills and fever.   HENT:  Negative for ear pain and sore throat.    Eyes:  Negative for pain and visual disturbance.   Respiratory:  Negative for cough and shortness of breath.    Cardiovascular:  Positive for palpitations. Negative for chest pain.   Gastrointestinal:  Negative for abdominal pain and vomiting.   Genitourinary:  Negative for dysuria and hematuria.   Musculoskeletal:  Positive for arthralgias. Negative for back pain.   Skin:  Negative for color change and rash.   Neurological:  Negative for seizures and syncope.   All other systems reviewed and are negative.      Objective:    /78 (BP Location: Right arm, Patient Position: Sitting, Cuff Size: Large)   Pulse 60   Temp 98.6 °F (37 °C) (Temporal)   Resp 18   Ht 5' 4.25\" (1.632 m)   Wt 118 kg (261 lb)   SpO2 97%   BMI 44.45 kg/m²     Physical Exam  Constitutional:       Appearance: Normal appearance. She is obese.   HENT:      Head: Normocephalic and atraumatic.      Nose: Nose normal.      Mouth/Throat:      Mouth: Mucous membranes are moist.   Eyes:      Extraocular Movements: Extraocular movements intact.      Pupils: Pupils are equal, round, and reactive to light.   Cardiovascular:      Rate and Rhythm: Normal rate and regular rhythm.      Pulses: " Normal pulses.      Heart sounds: Normal heart sounds.   Pulmonary:      Effort: Pulmonary effort is normal.      Breath sounds: Normal breath sounds.   Abdominal:      Palpations: Abdomen is soft.   Musculoskeletal:      Cervical back: Normal range of motion.   Skin:     General: Skin is warm.   Neurological:      General: No focal deficit present.      Mental Status: She is alert and oriented to person, place, and time.   Psychiatric:         Mood and Affect: Mood normal.         Behavior: Behavior normal.           Tierney Blancas PA-C  Bariatric Surgery

## 2025-04-10 NOTE — ASSESSMENT & PLAN NOTE
- Counseled patient on dietary and lifestyle modifications  -Continue metoprolol succinate 25 mg twice daily  -Continue to monitor

## 2025-04-10 NOTE — ASSESSMENT & PLAN NOTE
- Overall rare ectopic burden on recent ambulatory event monitor  -Given CHADS2 Vascor of 2 will continue Eliquis 5 mg twice daily and metoprolol succinate 25 mg twice daily   -will repeat ambulatory event monitor prior to next office visit

## 2025-04-10 NOTE — PROGRESS NOTES
Patient ID: Makayla Ruano is a 65 y.o. female.        Plan:      Assessment & Plan  Primary hypertension  - Counseled patient on dietary and lifestyle modifications  -Continue metoprolol succinate 25 mg twice daily  -Continue to monitor  Paroxysmal atrial fibrillation (HCC)  - Overall rare ectopic burden on recent ambulatory event monitor  -Given CHADS2 Vascor of 2 will continue Eliquis 5 mg twice daily and metoprolol succinate 25 mg twice daily   -will repeat ambulatory event monitor prior to next office visit    Obstructive sleep apnea treated with continuous positive airway pressure (CPAP)  - Counseled patient on dietary and lifestyle modifications along with need for compliance with CPAP therapy  -Continue to monitor  Class 3 severe obesity due to excess calories with serious comorbidity and body mass index (BMI) of 40.0 to 44.9 in adult (HCC)  - Counseled patient on dietary and lifestyle modifications along with need for weight reduction  -Continue to monitor  Nonrheumatic tricuspid valve regurgitation  - Mild on transthoracic echocardiogram March 2025  - Continue to monitor at this time.      Follow up Plan/Other summary comments:  -Lipid panel 2//2025 showing total cholesterol 126, triglyceride 108, HDL 33, LDL 71  - Follow-up pending laboratory study results  - Counseled patient on dietary and lifestyle modifications including following a low-salt, low-fat, heart healthy diet with sodium restriction to less than 1800 mg of sodium daily, DASH diet, NSAID avoidance, need for weight reduction goal BMI less than 29 along with compliance with CPAP therapy  - Patient will monitor home blood pressure readings let our office know if significantly elevated greater than 130/80's mmHg for up titration of medical therapy.  - Will repeat 1-week Zio patch monitor prior to next office visit  - Continue Eliquis 5 mg twice daily given CHADS2 Vascor of 2, metoprolol succinate 25 mg twice daily  - Will see patient in 6 months  or sooner if necessary  - Patient counseled if she were to have any warning or alarm type symptoms she is to seek emergency medical care immediately.    HPI:   - Patient is a 65-year-old female with documented sleep apnea compliant with CPAP therapy, obesity, hypertension, chronic pain with right knee and originally presented to the office for evaluation of palpitations and concern for paroxysmal atrial fibrillation.  This was confirmed on ambulatory event monitoring and medical therapy was uptitrated with initiation of oral anticoagulation.  She presents to the office today for follow-up.  - Currently in the office today patient denies any chest pain, palpitations, lightheadedness or dizziness, loss conscious, shortness of breath, lower extremity edema, orthopnea or bendopnea.  She states overall she is doing well and has been doing better with compliance with her CPAP therapy.  She is going to be working with weight management team to assist with weight reduction and will be increasing her physical capacity and monitoring for any significant symptoms.      Most recent or relevant cardiac/vascular testing:    -Ambulatory event monitor 3//2025 showing predominantly sinus rhythm average heart rate 74 bpm (ranging  bpm).  There was no significant pauses or advanced AV block with rare ectopic activity with PVCs accounting for less than 1% and total atrial fibrillation burden accounting for less than 1% with improvement in rate control strategy while in A-fib after initiation of medical therapy.    -Exercise stress ECG 2/22/2023 showing no evidence of ischemia on ECG.    -Transthoracic echocardiogram 3/11/2025 showing left ventricular systolic function normal estimated LVEF 60% with normal diastolic parameters, mild aortic regurgitation and mild tricuspid regurgitation with estimated RVSP 34 mmHg.      Past Surgical History:   Procedure Laterality Date    BUNIONECTOMY Right     TONSILLECTOMY           Review of  "Systems   Review of Systems   Constitutional:  Negative for chills, diaphoresis, fatigue and fever.   HENT:  Negative for trouble swallowing and voice change.    Eyes:  Negative for pain and redness.   Respiratory:  Negative for shortness of breath and wheezing.    Cardiovascular:  Negative for chest pain, palpitations and leg swelling.   Gastrointestinal:  Negative for abdominal pain, blood in stool, constipation, diarrhea, nausea and vomiting.   Genitourinary:  Negative for dysuria.   Musculoskeletal:  Positive for arthralgias. Negative for neck pain and neck stiffness.   Skin:  Negative for rash.   Neurological:  Negative for dizziness, syncope, light-headedness and headaches.   Psychiatric/Behavioral:  Negative for agitation and confusion.    All other systems reviewed and are negative.         Objective:     /64 (BP Location: Left arm, Patient Position: Sitting, Cuff Size: Large)   Pulse 77   Temp 97.6 °F (36.4 °C) (Temporal)   Resp 18   Ht 5' 6\" (1.676 m)   Wt 119 kg (263 lb)   SpO2 97%   BMI 42.45 kg/m²     PHYSICAL EXAM:  Physical Exam  Vitals reviewed.   Constitutional:       General: She is not in acute distress.     Appearance: She is obese. She is not diaphoretic.   HENT:      Head: Normocephalic and atraumatic.   Eyes:      General:         Right eye: No discharge.         Left eye: No discharge.   Neck:      Comments: Trachea midline, neck obese, difficult to assess JVD  Cardiovascular:      Rate and Rhythm: Normal rate and regular rhythm.      Heart sounds:      No friction rub.   Pulmonary:      Effort: Pulmonary effort is normal. No respiratory distress.      Breath sounds: No wheezing.   Chest:      Chest wall: No tenderness.   Abdominal:      General: Bowel sounds are normal.      Palpations: Abdomen is soft.      Tenderness: There is no abdominal tenderness. There is no rebound.   Musculoskeletal:      Right lower leg: No edema.      Left lower leg: No edema.   Skin:     General: " Skin is warm and dry.   Neurological:      Mental Status: She is alert.      Comments: Awake, alert, able to answer questions appropriately, able to move extremities bilaterally.   Psychiatric:         Mood and Affect: Mood normal.         Behavior: Behavior normal.            Meds reviewed.  Current Outpatient Medications on File Prior to Visit   Medication Sig Dispense Refill    apixaban (Eliquis) 5 mg Take 1 tablet (5 mg total) by mouth 2 (two) times a day 60 tablet 6    aspirin 81 mg chewable tablet Chew 81 mg daily      ciclopirox (PENLAC) 8 % solution Apply topically daily at bedtime 6 mL 11    meloxicam (MOBIC) 15 mg tablet take 1 tablet by mouth once daily 90 tablet 1    metoprolol succinate (TOPROL-XL) 25 mg 24 hr tablet Take 1 tablet (25 mg total) by mouth 2 (two) times a day 60 tablet 6    promethazine-dextromethorphan (PHENERGAN-DM) 6.25-15 mg/5 mL oral syrup Take 5 mL by mouth 4 (four) times a day as needed for cough 240 mL 1    Turmeric 500 MG CAPS Take 1,000 mg by mouth in the morning       No current facility-administered medications on file prior to visit.      Past Medical History:   Diagnosis Date    Allergic     Whole Life    Arthritis     X-ray    Dizziness     Kidney stone     Obesity     Palpitations     SOB (shortness of breath)     Tachycardia            Social History     Tobacco Use   Smoking Status Former    Current packs/day: 0.00    Average packs/day: 2.0 packs/day for 5.0 years (10.0 ttl pk-yrs)    Types: Cigarettes    Start date: 1977    Quit date:     Years since quittin.3   Smokeless Tobacco Never     Family History   Problem Relation Age of Onset    No Known Problems Mother     No Known Problems Father     No Known Problems Sister     No Known Problems Daughter     No Known Problems Daughter     No Known Problems Maternal Grandmother     No Known Problems Maternal Grandfather     No Known Problems Paternal Grandmother     No Known Problems Paternal  Grandfather     No Known Problems Son     No Known Problems Maternal Aunt     No Known Problems Maternal Aunt     No Known Problems Maternal Aunt     No Known Problems Paternal Aunt

## 2025-04-10 NOTE — ASSESSMENT & PLAN NOTE
- Counseled patient on dietary and lifestyle modifications along with need for weight reduction  -Continue to monitor

## 2025-04-10 NOTE — ASSESSMENT & PLAN NOTE
-- Discussed options of HealthyCORE-Intensive Lifestyle Intervention Program, Very Low Calorie Diet-VLCD, Conservative Program, Corinna-En-Y Gastric Bypass, and Vertical Sleeve Gastrectomy and the role of weight loss medications.  - Initial weight loss goal of 5-10% weight loss for improved health  - Patient is interested in pursuing Conservative Program  - Screening labs: Ordered A1C and fasting insulin in addition to already ordered labs  - Calorie goal: 1873-6830 calories per day  - Start diet and exercise  - Discussed option of Zepbound 2.5mg weekly due to SHIVA and obesity, patient prefers to wait   - Phentermine contraindicated due to age and cardiac history, avoid topiramate due to hx kidney stones  - Medication agreement signed: No  - Follow up in 2 months  - Dietician: Agreeable in future

## 2025-04-10 NOTE — ASSESSMENT & PLAN NOTE
- Counseled patient on dietary and lifestyle modifications along with need for compliance with CPAP therapy  -Continue to monitor

## 2025-04-22 DIAGNOSIS — M19.90 ARTHRITIS: ICD-10-CM

## 2025-04-22 DIAGNOSIS — I48.0 PAROXYSMAL ATRIAL FIBRILLATION (HCC): ICD-10-CM

## 2025-04-22 DIAGNOSIS — I10 PRIMARY HYPERTENSION: ICD-10-CM

## 2025-04-22 RX ORDER — METOPROLOL SUCCINATE 25 MG/1
25 TABLET, EXTENDED RELEASE ORAL 2 TIMES DAILY
Qty: 180 TABLET | Refills: 1 | Status: SHIPPED | OUTPATIENT
Start: 2025-04-22

## 2025-04-22 NOTE — TELEPHONE ENCOUNTER
Pharmacy change    Reason for call:   [x] Refill   [] Prior Auth  [] Other:     Office:   [] PCP/Provider -   [x] Specialty/Provider - Bj Daly     Medication: apixaban (Eliquis) 5 mg     Dose/Frequency: Take 1 tablet (5 mg total) by mouth 2 (two) times a day     Quantity: 180    Medication:   metoprolol succinate (TOPROL-XL) 25 mg 24 hr tablet     Dose/Frequency: Take 1 tablet (25 mg total) by mouth 2 (two) times a day    Quantity: 180    Pharmacy: [facility]      Mail Away Pharmacy   Does the patient have enough for 10 days?   [x] Yes   [] No - Send as HP to POD

## 2025-04-22 NOTE — TELEPHONE ENCOUNTER
Reason for call:   [x] Refill   [] Prior Auth  [] Other:     Office:   [x] PCP/Provider -   [] Specialty/Provider -     Medication: Meloxicam    Dose/Frequency: 15 mg    Quantity: 90 tablets    Pharmacy: [facility] - [place] [facility] - [address] [facility] [phone]     Local Pharmacy   Does the patient have enough for 3 days?   [] Yes   [] No - Send as HP to POD    Mail Away Pharmacy   Does the patient have enough for 10 days?   [x] Yes   [] No - Send as HP to POD

## 2025-04-23 RX ORDER — MELOXICAM 15 MG/1
15 TABLET ORAL DAILY
Qty: 90 TABLET | Refills: 1 | Status: SHIPPED | OUTPATIENT
Start: 2025-04-23

## 2025-05-12 ENCOUNTER — OFFICE VISIT (OUTPATIENT)
Dept: SLEEP CENTER | Facility: CLINIC | Age: 65
End: 2025-05-12
Payer: COMMERCIAL

## 2025-05-12 VITALS
SYSTOLIC BLOOD PRESSURE: 138 MMHG | WEIGHT: 258 LBS | BODY MASS INDEX: 44.05 KG/M2 | TEMPERATURE: 97.2 F | OXYGEN SATURATION: 97 % | DIASTOLIC BLOOD PRESSURE: 100 MMHG | HEART RATE: 80 BPM | HEIGHT: 64 IN

## 2025-05-12 DIAGNOSIS — I48.0 PAROXYSMAL ATRIAL FIBRILLATION (HCC): ICD-10-CM

## 2025-05-12 DIAGNOSIS — G47.33 OBSTRUCTIVE SLEEP APNEA TREATED WITH CONTINUOUS POSITIVE AIRWAY PRESSURE (CPAP): Primary | ICD-10-CM

## 2025-05-12 DIAGNOSIS — I10 PRIMARY HYPERTENSION: ICD-10-CM

## 2025-05-12 DIAGNOSIS — G47.61 PLMD (PERIODIC LIMB MOVEMENT DISORDER): ICD-10-CM

## 2025-05-12 PROCEDURE — 99214 OFFICE O/P EST MOD 30 MIN: CPT | Performed by: NURSE PRACTITIONER

## 2025-05-12 NOTE — ASSESSMENT & PLAN NOTE
She is not aware of any limb movements during the night.  Her  hasn't mentioned any excessive limb movements.    Lab testing was completed, as ordered.  B12 supplement within a multivitamin was recommended.  Magnesium glycinate at the evening meal was recommended as well.

## 2025-05-12 NOTE — PROGRESS NOTES
Name: Makayla Ruano      : 1960      MRN: [id number]  Encounter Provider: ALICIA Torres  Encounter Date: 2025   Encounter department: [facility] SLEEP MEDICINE [place]  :  Assessment & Plan  Obstructive sleep apnea treated with continuous positive airway pressure (CPAP)  She feels very tired when going to bed and doesn't bother to get her CPAP equipment ready for bed.  We discussed the importance of using the equipment every night to treat the sleep apnea.  Consequences of not using equipment were briefly reviewed again.  Her allergies have been flaring lately, causing some coughing.  This causes her to have difficulty using the equipment.  Once she falls asleep, at times the pressure increases and causes her to remove it or turn it off and restart the equipment to lower the upper limit.  A pressure change was previously ordered and was done today in the office.        Current PAP Compliance Data:  Average usage:  She has been able to use the equipment 39% of all days recorded.  Average usage was 4 or more hours 24% of all days recorded.  Average hours used:  4 hours and 44 minutes  Average time in an air leak:  8.9-83.0 L/min   Average pressure:  12.8 cm of water pressure  Residual AHI:  3.3/hour, including 1.2 OA, 0.2 CA, 1.1 hypopneas    The patient feels they benefit from the use of PAP equipment and would like to continue PAP therapy.    Response to treatment has been a struggle.    The patient is not at goal for hours of PAP use and at goal for effectiveness of treatment.  Pressure change did not take effect but will be changed today.  She was advised to review the maxwell over the next 2 weeks to view events and to call or message if events are consistently higher than 5.  A prescription for supplies has been provided to last for the next year.  The patient was advised to continue to clean the equipment appropriately, as discussed and to change supplies regularly.    She will continue using  this equipment at the settings noted above for the next 12 months.  At that timeshe will then return for a routine follow-up evaluation. I have asked the patient to contact the [facility] if she encounters any difficulties prior to that time.  Orders:    PAP DME Resupply/Reorder    Paroxysmal atrial fibrillation (HCC)  We discussed the association between atrial fibrillation and SHIVA.  The importance of having good compliance with use of PAP therapy to prevent worsening or further complications with A-fib was discussed.         Primary hypertension  Hypertension - Blood pressure is elevated today.  We reviewed the association between untreated obstructive sleep apnea and the increased risk for hypertension. Patient to continue on prescribed anti-hypertensive therapy (metoprolol) and follow up with PCP for continuity of care.          PLMD (periodic limb movement disorder)  She is not aware of any limb movements during the night.  Her  hasn't mentioned any excessive limb movements.    Lab testing was completed, as ordered.  B12 supplement within a multivitamin was recommended.  Magnesium glycinate at the evening meal was recommended as well.           History of Present Illness   Makayla Ruano 65 y.o. is a  female with PMHx of obesity and hypertension, with past reports of dizziness, palpitations, shortness of breath and tachycardia, who presents for follow up of mild obstructive sleep apnea.  She had an initial consultation with Dr. Loving in May 2024.  She had concern of loud snoring, night time awakenings, including 1-2 times per night for urination, non-refreshing sleep.  [place] was noted at 15/24.     A diagnostic sleep study was ordered and completed in May 2024.  During the study, there were a total of 55 respiratory events made up of 4 obstructive apneas, 0 mixed apneas, 0 central apneas, and 51 hypopneas resulting in an apnea/hypopnea index (AHI) of 10.2 events per hour of sleep.  The AHI  "in the supine position was 18.4.  The AHI during REM sleep was 36.6.  Moderate intensity snoring was noted and there were 3 respiratory effort related arousals.   The amount of sleep time below 90% was 25.4 minutes.  The lowest oxygen saturation was 81.0%      She was set up with CPAP equipment and returns to review compliance and effectiveness of treatment.           Review of Systems  Pertinent positives/negatives included in HPI and also as noted: allergy symptoms flaring    Medical History Reviewed by provider this encounter:  Tobacco  Allergies  Meds  Problems  Med Hx  Surg Hx  Fam Hx     .  Current Outpatient Medications on File Prior to Visit   Medication Sig Dispense Refill    apixaban (Eliquis) 5 mg Take 1 tablet (5 mg total) by mouth 2 (two) times a day 180 tablet 1    aspirin 81 mg chewable tablet Chew 81 mg daily      ciclopirox (PENLAC) 8 % solution Apply topically daily at bedtime 6 mL 11    meloxicam (MOBIC) 15 mg tablet Take 1 tablet (15 mg total) by mouth daily 90 tablet 1    metoprolol succinate (TOPROL-XL) 25 mg 24 hr tablet Take 1 tablet (25 mg total) by mouth 2 (two) times a day 180 tablet 1    Turmeric 500 MG CAPS Take 1,000 mg by mouth in the morning       No current facility-administered medications on file prior to visit.      Objective   /100   Pulse 80   Temp (!) 97.2 °F (36.2 °C)   Ht 5' 4.25\" (1.632 m)   Wt 117 kg (258 lb)   SpO2 97%   BMI 43.94 kg/m²        Physical Exam  Visit Vitals  /100   Pulse 80   Temp (!) 97.2 °F (36.2 °C)   Ht 5' 4.25\" (1.632 m)   Wt 117 kg (258 lb)   SpO2 97%   BMI 43.94 kg/m²   OB Status Postmenopausal   Smoking Status Former   BSA 2.19 m²       Constitutional: Alert, cooperative, no distress, appears stated age, obese  Skin: Warm, dry, no rashes noted  Lungs: Clear to auscultation bilaterally, respirations unlabored  Heart: Normal rate and regular rhythm, S1/2 normal, no murmur noted, no rub or gallop  Extremities: Normal, no digital " clubbing, no pedal edema  Neuro: No focal deficits noted      Data  Lab Results   Component Value Date    HGB 14.5 02/04/2025    HCT 44.7 02/04/2025    MCV 90 02/04/2025      Lab Results   Component Value Date    CALCIUM 8.6 02/04/2025    K 4.2 02/04/2025    CO2 25 02/04/2025     02/04/2025    BUN 17 02/04/2025    CREATININE 0.58 (L) 02/04/2025     Lab Results   Component Value Date    IRON 60 02/04/2025    TIBC 312.2 02/04/2025    FERRITIN 230 02/04/2025     Lab Results   Component Value Date    AST 39 02/04/2025    ALT 56 (H) 02/04/2025       Administrative Statements   I have spent a total time of 30 minutes in caring for this patient on the day of the visit/encounter including Risks and benefits of tx options, Instructions for management, Patient and family education, Importance of tx compliance, Risk factor reductions, Impressions, Counseling / Coordination of care, Documenting in the medical record, and Reviewing/placing orders in the medical record (including tests, medications, and/or procedures).

## 2025-05-12 NOTE — ASSESSMENT & PLAN NOTE
She feels very tired when going to bed and doesn't bother to get her CPAP equipment ready for bed.  We discussed the importance of using the equipment every night to treat the sleep apnea.  Consequences of not using equipment were briefly reviewed again.  Her allergies have been flaring lately, causing some coughing.  This causes her to have difficulty using the equipment.  Once she falls asleep, at times the pressure increases and causes her to remove it or turn it off and restart the equipment to lower the upper limit.  A pressure change was previously ordered and was done today in the office.        Current PAP Compliance Data:  Average usage:  She has been able to use the equipment 39% of all days recorded.  Average usage was 4 or more hours 24% of all days recorded.  Average hours used:  4 hours and 44 minutes  Average time in an air leak:  8.9-83.0 L/min   Average pressure:  12.8 cm of water pressure  Residual AHI:  3.3/hour, including 1.2 OA, 0.2 CA, 1.1 hypopneas    The patient feels they benefit from the use of PAP equipment and would like to continue PAP therapy.    Response to treatment has been a struggle.    The patient is not at goal for hours of PAP use and at goal for effectiveness of treatment.  Pressure change did not take effect but will be changed today.  She was advised to review the maxwell over the next 2 weeks to view events and to call or message if events are consistently higher than 5.  A prescription for supplies has been provided to last for the next year.  The patient was advised to continue to clean the equipment appropriately, as discussed and to change supplies regularly.    She will continue using this equipment at the settings noted above for the next 12 months.  At that timeshe will then return for a routine follow-up evaluation. I have asked the patient to contact the [facility] if she encounters any difficulties prior to that time.  Orders:    PAP DME  Resupply/Reorder

## 2025-05-12 NOTE — PATIENT INSTRUCTIONS
Patient Instructions:    1.  Continue use of PAP equipment nightly  2.  Continue to clean your equipment, as discussed  3.  Contact the [facility] with any questions or concerns prior to your next visit, as needed  4.  Schedule visit for follow-up in 1 year  5.  You may want to consider magnesium supplement of magnesium glycinate 300-400mg in the evening, which may help with sleep.  6.  View the [employer] maxwell and call or message if events are consistently higher than 5.        Suggested PAP supply Replacement Frequency  Disposable filters....................................every 2 weeks  Replaceable nasal mask cushions.........every 2 weeks  Replaceable full face cushions...............every 1 month  Mask.......................................................every 3 months  Tubing.....................................................every 3 months  Head Gear..............................................every 6 months  Water chamber.......................................every 6 months         Thank you for trusting me with your care!    I know we often  cover a lot of information at the visit, so if you have follow-up questions, are unclear about the plan, or feel there were important items that we did not discuss or you did not receive clarity on, please don't hesitate to reach out to me.     [employer] messages are preferred for routine matters.  Please make sure to call for urgent matters as there can be a delay in responding to questions over [employer].    IMPORTANT- Prior to a sleep study (at home or in the sleep lab), I strongly recommend contacting your medical insurance first to understand your benefits (including deductible if applicable), coverage for this test, and out of pocket costs.  Even if the test is approved by medical insurance, the cost to you is determined by your medical benefits.   If you have concerns about your out of pocket costs for sleep testing, please contact me/the office before you  complete the test and we can discuss if there are alternate options.     I recommend following this advice in general before any lab test, imaging test, doctor visit, surgery, or ordering CPAP supplies as it is best to understand your coverage to avoid unexpected bills after the fact.       Nursing Support:  When: Monday through Friday 7:30A-4:30PM except holidays  Where: Our direct line is [phone]  *3  *1.      If you are having a true emergency please call 911.  In the event that the line is busy or it is after hours please leave a voice message and we will return your call.  Please speak clearly, leaving your full name, birth date, best number to reach you and the reason for your call.   Medication refills: We will need the name of the medication, the dosage, the ordering provider, whether you get a 30 or 90 day refill, and the pharmacy name and address.  Medications will be ordered by the provider only.  Nurses cannot call in prescriptions.  Please allow 7 days for medication refills.  Physician requested updates: If your provider requested that you call with an update after starting medication, please be ready to provide us the medication and dosage, what time you take your medication, the time you attempt to fall asleep, time you fall asleep, when you wake up, and what time you get out of bed.  Sleep Study Results: We will contact you with sleep study results and/or next steps after the physician has reviewed your testing.

## 2025-05-12 NOTE — ASSESSMENT & PLAN NOTE
We discussed the association between atrial fibrillation and SHIVA.  The importance of having good compliance with use of PAP therapy to prevent worsening or further complications with A-fib was discussed.

## 2025-05-12 NOTE — ASSESSMENT & PLAN NOTE
Hypertension - Blood pressure is elevated today.  We reviewed the association between untreated obstructive sleep apnea and the increased risk for hypertension. Patient to continue on prescribed anti-hypertensive therapy (metoprolol) and follow up with PCP for continuity of care.           Subjective:     Chioma Yanez is a 59 y.o. female who presents for Ankle Injury (left ankle injury due to hiking down steep grade. Left knee locks up at times. The patient given crutches and splint. The patient took off splint at times, walked on it at times and left ankle/foot swells up and painful.)    HPI  Pt presents for evaluation of left ankle injury   Patient with left ankle injury 1 week ago  Patient hiking down a steep grade and had a trip and fall  She was evaluated in urgent care 2 days later and found to have a small lateral malleolus fracture  Patient placed in a splint which she has been compliant with most of the time  Patient does admit to walking without splint at times which is very painful      Review of Systems   Constitutional: Negative for fever.   Respiratory: Negative for shortness of breath.    Cardiovascular: Negative for chest pain.   Gastrointestinal: Negative for vomiting.   Skin: Negative for rash.   Neurological: Negative for focal weakness.       PMH:  has a past medical history of Arthritis and Palpitations. She also has no past medical history of Breast cancer (HCC).  MEDS:   Current Outpatient Medications:   •  METFORMIN HCL PO, Take  by mouth., Disp: , Rfl:   •  benzonatate (TESSALON) 100 MG Cap, Take 1 Cap by mouth 3 times a day as needed for Cough. (Patient not taking: Reported on 6/19/2020), Disp: 60 Cap, Rfl: 0  •  azithromycin (ZITHROMAX) 250 MG Tab, Take as directed on package. Dispense one package., Disp: 6 Tab, Rfl: 0  •  gabapentin (NEURONTIN) 300 MG Cap, Take 1 Cap by mouth 3 times a day. (Patient not taking: Reported on 6/19/2020), Disp: 90 Cap, Rfl: 1  •  hydrocodone-acetaminophen (NORCO) 5-325 MG Tab per tablet, Take 1 Tab by mouth every 6 hours as needed., Disp: 14 Tab, Rfl: 0  •  predniSONE (DELTASONE) 10 MG Tab, Start 5 tabs (50mg) dose day one, then taper as dir. (Patient not taking: Reported on 6/19/2020), Disp: 30 Tab, Rfl: 0  •  zolpidem (AMBIEN) 10 MG  Tab, Take 5 mg by mouth at bedtime as needed for Sleep., Disp: , Rfl:   •  MILK THISTLE PLUS PO, Take 1 Capsule by mouth every day., Disp: , Rfl:   •  cyanocobalamin (VITAMIN B-12) 100 MCG Tab, Take 100 mcg by mouth every day., Disp: , Rfl:   •  Flaxseed, Linseed, (FLAX SEED OIL PO), Take 1 Cap by mouth every day., Disp: , Rfl:   •  ibuprofen (MOTRIN) 200 MG Tab, Take 200 mg by mouth every 6 hours as needed for Mild Pain or Inflammation., Disp: , Rfl:   •  GLUCOSAMINE CHONDROITIN COMPLX PO, Take 1 Tab by mouth 2 Times a Day., Disp: , Rfl:   •  TURMERIC PO, Take 1 Capsule by mouth every day., Disp: , Rfl:   •  B Complex Vitamins (VITAMIN B COMPLEX PO), Take 1 Capsule by mouth every day., Disp: , Rfl:   •  ascorbic acid (ASCORBIC ACID) 500 MG Tab, Take 500 mg by mouth every day., Disp: , Rfl:   •  Calcium-Magnesium-Zinc (MICAELA-MAG-ZINC PO), Take 1 Capsule by mouth every day., Disp: , Rfl:   ALLERGIES:   Allergies   Allergen Reactions   • Codeine Shortness of Breath     SURGHX:   Past Surgical History:   Procedure Laterality Date   • KNEE ARTHROSCOPY Left 12/2/2015    Procedure: KNEE ARTHROSCOPY;  Surgeon: Edwin Simeon M.D.;  Location: [facility];  Service:    • MENISCECTOMY Left 12/2/2015    Procedure: MENISCECTOMY partial lateral ;  Surgeon: Edwin Simeon M.D.;  Location: [facility];  Service:    • CHONDROPLASTY Left 12/2/2015    Procedure: CHONDROPLASTY;  Surgeon: Edwin Simeon M.D.;  Location: [facility];  Service:    • PB REPAIR EYE BLOWOUT,PERIORB+IMPLNT  2006    Left eye fracture repair   • PB C-SEC ONLY,PBEV C-SEC  1981     SOCHX:  reports that she has never smoked. She has never used smokeless tobacco. She reports current alcohol use of about 0.6 oz of alcohol per week. She reports that she does not use drugs.  FH: Family history was reviewed, not contributing to acute injury      Objective:   There were no vitals taken for this visit.    Physical  Exam  Constitutional:       General: She is not in acute distress.     Appearance: She is well-developed. She is not diaphoretic.   HENT:      Head: Normocephalic and atraumatic.   Musculoskeletal:      Comments: Left ankle/foot:  Appearance - Moderate swelling throughout foot and lateral ankle   Palpation -+TTP along lateral malleolus, and throughout dorsal midfoot  Special testing - Neg squeeze test, neg drawer test  Neurovascular - 2+ dorsalis pedis and posterior tibial.  Sensation intact and equal bilaterally   Skin:     General: Skin is warm and dry.      Findings: No erythema.   Neurological:      Mental Status: She is alert and oriented to person, place, and time.      Sensory: No sensory deficit.   Psychiatric:         Behavior: Behavior normal.         Thought Content: Thought content normal.         Judgment: Judgment normal.         Assessment/Plan:   Assessment    1. Closed displaced fracture of lateral malleolus of left fibula, initial encounter    Patient is a 59-year-old female with closed mildly displaced fracture of lateral malleolus.  Based on fracture appearance, patient is a good candidate for nonoperative management.  We will plan to repeat x-rays in 1 week to confirm fracture stability.  Patient with moderate tenderness in dorsal midfoot.  If continues to have significant tenderness in this area at next visit will plan to x-ray foot as well as ankle.  Patient placed into a tall cam walking boot and will wean out of the crutches and start weightbearing as tolerated.  We will plan minimum 6 weeks (until August 5, 2020) immobilization.  Follow-up 1 week.

## 2025-05-13 ENCOUNTER — TELEPHONE (OUTPATIENT)
Dept: SLEEP CENTER | Facility: CLINIC | Age: 65
End: 2025-05-13

## 2025-06-19 ENCOUNTER — OFFICE VISIT (OUTPATIENT)
Dept: BARIATRICS | Facility: CLINIC | Age: 65
End: 2025-06-19
Payer: COMMERCIAL

## 2025-06-19 ENCOUNTER — TELEPHONE (OUTPATIENT)
Dept: BARIATRICS | Facility: CLINIC | Age: 65
End: 2025-06-19

## 2025-06-19 VITALS
WEIGHT: 258 LBS | SYSTOLIC BLOOD PRESSURE: 120 MMHG | HEIGHT: 64 IN | DIASTOLIC BLOOD PRESSURE: 74 MMHG | BODY MASS INDEX: 44.05 KG/M2 | OXYGEN SATURATION: 95 % | HEART RATE: 59 BPM | RESPIRATION RATE: 18 BRPM | TEMPERATURE: 97.8 F

## 2025-06-19 DIAGNOSIS — G47.33 OBSTRUCTIVE SLEEP APNEA TREATED WITH CONTINUOUS POSITIVE AIRWAY PRESSURE (CPAP): ICD-10-CM

## 2025-06-19 DIAGNOSIS — E66.813 CLASS 3 OBESITY: Primary | ICD-10-CM

## 2025-06-19 PROCEDURE — 99214 OFFICE O/P EST MOD 30 MIN: CPT | Performed by: PHYSICIAN ASSISTANT

## 2025-06-19 NOTE — ASSESSMENT & PLAN NOTE
-Patient is pursuing Conservative Program  -Initial weight loss goal of 5-10% weight loss for improved health  -Screening labs: reviewed CMP, TSH, Lipid panel. Reminded patient to complete HgbA1c and fasting insulin  -Not a candidate for sympathomimetics  -Not a candidate for Topamax due to kidney stone  -dietary recall reviewed. Encouraged food logging, measuring portions. Reviewed protein goals. Patient is currently lacking on protein.  -questions answered  -patient prefers to avoid AOMs at this time.    Initial: 261 lbs  Current: 258 lbs  Change: -3 lbs  Goal: 200 lbs

## 2025-06-19 NOTE — ASSESSMENT & PLAN NOTE
-encouraged compliance with CPAP  -weight loss encouraged  -continue follow up with sleep medicine

## 2025-06-19 NOTE — PATIENT INSTRUCTIONS
Goals:    Food log (ie.) [email or web address].   No sugary beverages. At least 64oz of water daily.  Increase physical activity by 10 minutes daily. Gradually increase physical activity to a goal of 5 days per week for 30 minutes of MODERATE intensity PLUS 2 days per week of FULL BODY resistance training  0281-4783 Calories per day  80 grams of protein per day  4oz lean protein, 1/2 cup starch, 1-2 cups nonstarchy veggies  20grams protein at eachmeal

## 2025-06-19 NOTE — PROGRESS NOTES
Assessment/Plan:    Class 3 obesity  -Patient is pursuing Conservative Program  -Initial weight loss goal of 5-10% weight loss for improved health  -Screening labs: reviewed CMP, TSH, Lipid panel. Reminded patient to complete HgbA1c and fasting insulin  -Not a candidate for sympathomimetics  -Not a candidate for Topamax due to kidney stone  -dietary recall reviewed. Encouraged food logging, measuring portions. Reviewed protein goals. Patient is currently lacking on protein.  -questions answered  -patient prefers to avoid AOMs at this time.    Initial: 261 lbs  Current: 258 lbs  Change: -3 lbs  Goal: 200 lbs    Obstructive sleep apnea treated with continuous positive airway pressure (CPAP)  -encouraged compliance with CPAP  -weight loss encouraged  -continue follow up with sleep medicine    Goals:    Food log (ie.) [email or web address],sparkpeople.com,[email or web address],calorieking.[email or web address],etc.   No sugary beverages. At least 64oz of water daily.  Increase physical activity by 10 minutes daily. Gradually increase physical activity to a goal of 5 days per week for 30 minutes of MODERATE intensity PLUS 2 days per week of FULL BODY resistance training  3254-8017 Calories per day  80 grams of protein per day  4oz lean protein, 1/2 cup starch, 1-2 cups nonstarchy veggies  20grams protein at eachmeal    Follow up in approximately 3 months with Non-Surgical Physician/Advanced Practitioner.     Diagnoses and all orders for this visit:    Class 3 obesity    BMI 40.0-44.9, adult (HCC)    Obstructive sleep apnea treated with continuous positive airway pressure (CPAP)          Subjective:   Chief Complaint   Patient presents with    Follow-up     2 month follow up         Patient ID: Makayla Ruano  is a 65 y.o. female with excess weight/obesity here to pursue weight managment.  Patient is pursuing Conservative Program.     HPI Patient presents for [facility] follow up. She has had a lot of life changes since she was here last. 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 Her aunt has no microwave or oven    Food logging: denies  Hydration: water 6-10 glasses  ETOH: denies, rarely  Exercise: denies  SLeep: 6-7 hours, wearing CPAP    B: eggs +/- potato or cereal + 2% or whole milk  S: skips  L: mac and cheese OR ham and cheese + fruit + veggie  S: candy or baked goods or tortilla chips and salsa  D: pasta or salad OR Meat + potato, (Dines out 3x per week)  S: sweets    Wt Readings from Last 10 Encounters:   06/19/25 117 kg (258 lb)   05/12/25 117 kg (258 lb)   04/10/25 118 kg (261 lb)   04/10/25 119 kg (263 lb)   03/11/25 118 kg (260 lb)   03/05/25 118 kg (260 lb)   02/11/25 116 kg (256 lb)   01/22/25 121 kg (266 lb 9.6 oz)   11/13/24 122 kg (269 lb)   08/12/24 120 kg (265 lb 6.4 oz)       The following portions of the patient's history were reviewed and updated as appropriate: allergies, current medications, past family history, past medical history, past social history, past surgical history, and problem list.    Review of Systems   Respiratory:  Negative for shortness of breath.    Cardiovascular:  Negative for chest pain.   Gastrointestinal:  Negative for abdominal pain, nausea and vomiting.   Psychiatric/Behavioral:  Negative for dysphoric mood.        Objective:    /74 (BP Location: Left arm, Patient Position: Sitting, Cuff Size: Standard) Comment (Cuff Size): on left forarm  Pulse 59   Temp 97.8 °F (36.6 °C) (Temporal)   Resp 18   Ht 5' 4.25\" (1.632 m)   Wt 117 kg (258 lb)   SpO2 95%   BMI 43.94 kg/m²      Physical Exam  Vitals and nursing note reviewed.   Constitutional:       General: She is not in acute distress.     Appearance: She is obese. She is not ill-appearing or toxic-appearing.   HENT:      Head: Normocephalic and atraumatic.      Nose: Nose normal.      Mouth/Throat:      Mouth: Mucous membranes are moist.     Eyes:      General: " No scleral icterus.    Pulmonary:      Effort: Pulmonary effort is normal. No respiratory distress.   Abdominal:      Comments: protuberant     Musculoskeletal:         General: Normal range of motion.      Cervical back: Neck supple.     Skin:     General: Skin is dry.      Coloration: Skin is not jaundiced.     Neurological:      General: No focal deficit present.      Mental Status: She is alert and oriented to person, place, and time. Mental status is at baseline.     Psychiatric:         Mood and Affect: Mood normal.         Behavior: Behavior normal.         Thought Content: Thought content normal.         Judgment: Judgment normal.

## 2025-07-22 ENCOUNTER — APPOINTMENT (OUTPATIENT)
Dept: LAB | Facility: CLINIC | Age: 65
End: 2025-07-22
Payer: COMMERCIAL

## 2025-07-22 DIAGNOSIS — R79.89 ABNORMAL LIVER FUNCTION TEST: ICD-10-CM

## 2025-07-22 DIAGNOSIS — E55.9 VITAMIN D DEFICIENCY: ICD-10-CM

## 2025-07-22 DIAGNOSIS — R79.89 ABNORMAL CBC: ICD-10-CM

## 2025-07-22 DIAGNOSIS — E66.813 CLASS 3 SEVERE OBESITY DUE TO EXCESS CALORIES WITH SERIOUS COMORBIDITY AND BODY MASS INDEX (BMI) OF 40.0 TO 44.9 IN ADULT: ICD-10-CM

## 2025-07-22 DIAGNOSIS — Z13.220 SCREENING CHOLESTEROL LEVEL: ICD-10-CM

## 2025-07-22 DIAGNOSIS — Z00.00 ANNUAL PHYSICAL EXAM: ICD-10-CM

## 2025-07-22 LAB
25(OH)D3 SERPL-MCNC: 32.1 NG/ML (ref 30–100)
ALBUMIN SERPL BCG-MCNC: 3.8 G/DL (ref 3.5–5)
ALP SERPL-CCNC: 81 U/L (ref 34–104)
ALT SERPL W P-5'-P-CCNC: 16 U/L (ref 7–52)
ANION GAP SERPL CALCULATED.3IONS-SCNC: 9 MMOL/L (ref 4–13)
AST SERPL W P-5'-P-CCNC: 19 U/L (ref 13–39)
BASOPHILS # BLD AUTO: 0.06 THOUSANDS/ÂΜL (ref 0–0.1)
BASOPHILS NFR BLD AUTO: 1 % (ref 0–1)
BILIRUB SERPL-MCNC: 0.69 MG/DL (ref 0.2–1)
BUN SERPL-MCNC: 19 MG/DL (ref 5–25)
CALCIUM SERPL-MCNC: 8.9 MG/DL (ref 8.4–10.2)
CHLORIDE SERPL-SCNC: 107 MMOL/L (ref 96–108)
CHOLEST SERPL-MCNC: 146 MG/DL (ref ?–200)
CO2 SERPL-SCNC: 25 MMOL/L (ref 21–32)
CREAT SERPL-MCNC: 0.49 MG/DL (ref 0.6–1.3)
EOSINOPHIL # BLD AUTO: 0.36 THOUSAND/ÂΜL (ref 0–0.61)
EOSINOPHIL NFR BLD AUTO: 4 % (ref 0–6)
ERYTHROCYTE [DISTWIDTH] IN BLOOD BY AUTOMATED COUNT: 13.1 % (ref 11.6–15.1)
EST. AVERAGE GLUCOSE BLD GHB EST-MCNC: 105 MG/DL
GFR SERPL CREATININE-BSD FRML MDRD: 101 ML/MIN/1.73SQ M
GLUCOSE P FAST SERPL-MCNC: 98 MG/DL (ref 65–99)
HBA1C MFR BLD: 5.3 %
HCT VFR BLD AUTO: 43 % (ref 34.8–46.1)
HDLC SERPL-MCNC: 44 MG/DL
HGB BLD-MCNC: 14.4 G/DL (ref 11.5–15.4)
IMM GRANULOCYTES # BLD AUTO: 0.02 THOUSAND/UL (ref 0–0.2)
IMM GRANULOCYTES NFR BLD AUTO: 0 % (ref 0–2)
INSULIN SERPL-ACNC: 10.81 UIU/ML (ref 1.9–23)
LDLC SERPL CALC-MCNC: 83 MG/DL (ref 0–100)
LYMPHOCYTES # BLD AUTO: 2.88 THOUSANDS/ÂΜL (ref 0.6–4.47)
LYMPHOCYTES NFR BLD AUTO: 32 % (ref 14–44)
MCH RBC QN AUTO: 30.3 PG (ref 26.8–34.3)
MCHC RBC AUTO-ENTMCNC: 33.5 G/DL (ref 31.4–37.4)
MCV RBC AUTO: 91 FL (ref 82–98)
MONOCYTES # BLD AUTO: 0.95 THOUSAND/ÂΜL (ref 0.17–1.22)
MONOCYTES NFR BLD AUTO: 11 % (ref 4–12)
NEUTROPHILS # BLD AUTO: 4.73 THOUSANDS/ÂΜL (ref 1.85–7.62)
NEUTS SEG NFR BLD AUTO: 52 % (ref 43–75)
NONHDLC SERPL-MCNC: 102 MG/DL
NRBC BLD AUTO-RTO: 0 /100 WBCS
PLATELET # BLD AUTO: 248 THOUSANDS/UL (ref 149–390)
PMV BLD AUTO: 9.6 FL (ref 8.9–12.7)
POTASSIUM SERPL-SCNC: 4 MMOL/L (ref 3.5–5.3)
PROT SERPL-MCNC: 6.7 G/DL (ref 6.4–8.4)
RBC # BLD AUTO: 4.75 MILLION/UL (ref 3.81–5.12)
SODIUM SERPL-SCNC: 141 MMOL/L (ref 135–147)
TRIGL SERPL-MCNC: 93 MG/DL (ref ?–150)
WBC # BLD AUTO: 9 THOUSAND/UL (ref 4.31–10.16)

## 2025-07-22 PROCEDURE — 80053 COMPREHEN METABOLIC PANEL: CPT

## 2025-07-22 PROCEDURE — 83525 ASSAY OF INSULIN: CPT

## 2025-07-22 PROCEDURE — 80061 LIPID PANEL: CPT

## 2025-07-22 PROCEDURE — 83036 HEMOGLOBIN GLYCOSYLATED A1C: CPT

## 2025-07-22 PROCEDURE — 36415 COLL VENOUS BLD VENIPUNCTURE: CPT

## 2025-07-22 PROCEDURE — 85025 COMPLETE CBC W/AUTO DIFF WBC: CPT

## 2025-07-22 PROCEDURE — 82306 VITAMIN D 25 HYDROXY: CPT

## 2025-07-23 ENCOUNTER — OFFICE VISIT (OUTPATIENT)
Dept: CARDIOLOGY CLINIC | Facility: CLINIC | Age: 65
End: 2025-07-23
Payer: COMMERCIAL

## 2025-07-23 VITALS
HEART RATE: 65 BPM | TEMPERATURE: 98.2 F | RESPIRATION RATE: 18 BRPM | DIASTOLIC BLOOD PRESSURE: 70 MMHG | HEIGHT: 64 IN | BODY MASS INDEX: 44.32 KG/M2 | WEIGHT: 259.6 LBS | OXYGEN SATURATION: 97 % | SYSTOLIC BLOOD PRESSURE: 134 MMHG

## 2025-07-23 DIAGNOSIS — I10 PRIMARY HYPERTENSION: ICD-10-CM

## 2025-07-23 DIAGNOSIS — I36.1 NONRHEUMATIC TRICUSPID VALVE REGURGITATION: ICD-10-CM

## 2025-07-23 DIAGNOSIS — G47.33 OBSTRUCTIVE SLEEP APNEA TREATED WITH CONTINUOUS POSITIVE AIRWAY PRESSURE (CPAP): ICD-10-CM

## 2025-07-23 DIAGNOSIS — I48.0 PAROXYSMAL ATRIAL FIBRILLATION (HCC): Primary | ICD-10-CM

## 2025-07-23 DIAGNOSIS — E66.813 CLASS 3 OBESITY: ICD-10-CM

## 2025-07-23 PROCEDURE — 99214 OFFICE O/P EST MOD 30 MIN: CPT | Performed by: INTERNAL MEDICINE

## 2025-07-23 NOTE — ASSESSMENT & PLAN NOTE
- Overall rare ectopic burden.  Will follow-up pending ambulatory event monitor results  -Continue Eliquis 5 mg twice daily metoprolol succinate 25 mg twice daily  -Continue to monitor

## 2025-07-23 NOTE — ASSESSMENT & PLAN NOTE
- Counseled patient on dietary and lifestyle modifications  - continue metoprolol succinate 25 mg twice daily  - Continue to monitor

## 2025-07-23 NOTE — PROGRESS NOTES
Patient ID: Makayla Ruano is a 65 y.o. female.        Plan:      Assessment & Plan  Paroxysmal atrial fibrillation (HCC)  - Overall rare ectopic burden.  Will follow-up pending ambulatory event monitor results  -Continue Eliquis 5 mg twice daily metoprolol succinate 25 mg twice daily  -Continue to monitor  Nonrheumatic tricuspid valve regurgitation  - Mild on transthoracic echocardiogram March 2025  -Continue to monitor  Obstructive sleep apnea treated with continuous positive airway pressure (CPAP)  - Counseled patient on dietary and lifestyle modifications along with need for continued compliance with CPAP therapy  -Continue to monitor  Class 3 obesity  - Counseled patient on dietary and lifestyle modifications along with need for weight reduction  -Continue to monitor  Primary hypertension  - Counseled patient on dietary and lifestyle modifications  - continue metoprolol succinate 25 mg twice daily  - Continue to monitor      Follow up Plan/Other summary comments:  -Lipid panel 7/22/2025 showing total cholesterol 146, triglyceride 93, HDL 44, LDL 83  - Counseled patient on dietary lifestyle modifications including following a low-salt, low-fat, heart healthy diet with sodium restriction to less than 1800 mg of sodium daily, DASH diet, NSAID avoidance, need for weight reduction goal BMI less than 29 and continued compliance with CPAP therapy  - Patient will monitor home blood pressure readings let her office know if significantly elevated greater than 130/80's mmHg for up titration of medical therapy  - Will continue Eliquis 5 mg twice daily and metoprolol succinate 25 mg twice daily  - I will see patient in 6 months or sooner if necessary and follow-up pending ambulatory event monitor  - Patient counseled if she were to have any warning or alarm type symptoms she is to seek emergency medical care immediately.    HPI:   - Patient is a 65-year-old female with documented sleep apnea compliant with CPAP therapy,  obesity, hypertension, chronic pain in right knee who originally presented to the office for evaluation of palpitations with concern for paroxysmal atrial fibrillation.  This was confirmed on ambulatory event monitoring and medical therapy was uptitrated along with initiation of oral anticoagulation.  She presents to the office today for follow-up.  - Currently in the office today patient denies any chest pain, palpitations, lightheadedness or dizziness, loss of consciousness, shortness of breath, lower extremity edema, orthopnea or bendopnea.  She notes blood pressures at home are well-controlled with no significant bleeding issues.      Most recent or relevant cardiac/vascular testing:    -Ambulatory event monitor March 2025 showing predominantly sinus rhythm average heart rate 74 bpm (ranging  bpm).  There was no significant pauses or advanced AV block with rare overall ectopic activity with PVCs accounting for less than 1% of total monitored beats and atrial fibrillation burden accounting for less than 1% with improvement in rate control strategy while in atrial fibrillation after initiation of medical therapy    -Exercise stress ECG 2/22/2023 showing no diagnostic evidence of ischemia    -Transthoracic echocardiogram March or 2025 showing left ventricular systolic function normal estimated LVEF 60% with normal diastolic parameters, mild aortic regurgitation and mild tricuspid regurgitation with estimated RVSP 34 mmHg.      Past Surgical History[1]      Review of Systems   Review of Systems   Constitutional:  Negative for chills, diaphoresis, fatigue and fever.   HENT:  Negative for trouble swallowing and voice change.    Eyes:  Negative for pain and redness.   Respiratory:  Negative for shortness of breath and wheezing.    Cardiovascular:  Negative for chest pain, palpitations and leg swelling.   Gastrointestinal:  Negative for abdominal pain, blood in stool, constipation, diarrhea, nausea and vomiting.  "  Genitourinary:  Negative for dysuria.   Musculoskeletal:  Positive for arthralgias. Negative for neck pain and neck stiffness.   Skin:  Negative for rash.   Neurological:  Negative for dizziness, syncope, light-headedness and headaches.   Psychiatric/Behavioral:  Negative for agitation and confusion.    All other systems reviewed and are negative.         Objective:     /70 (BP Location: Left arm, Patient Position: Sitting, Cuff Size: Large)   Pulse 65   Temp 98.2 °F (36.8 °C) (Temporal)   Resp 18   Ht 5' 4.25\" (1.632 m)   Wt 118 kg (259 lb 9.6 oz)   SpO2 97%   BMI 44.21 kg/m²     PHYSICAL EXAM:  Physical Exam  Vitals reviewed.   Constitutional:       General: She is not in acute distress.     Appearance: She is obese. She is not diaphoretic.   HENT:      Head: Normocephalic and atraumatic.     Eyes:      General:         Right eye: No discharge.         Left eye: No discharge.     Neck:      Comments: Trachea midline, neck obese, difficult to assess JVD  Cardiovascular:      Rate and Rhythm: Normal rate and regular rhythm.      Heart sounds:      No friction rub.   Pulmonary:      Effort: Pulmonary effort is normal. No respiratory distress.      Breath sounds: No wheezing.   Chest:      Chest wall: No tenderness.   Abdominal:      General: There is no distension.      Palpations: Abdomen is soft.      Tenderness: There is no abdominal tenderness. There is no rebound.     Musculoskeletal:      Right lower leg: No edema.      Left lower leg: No edema.     Skin:     General: Skin is warm and dry.     Neurological:      Mental Status: She is alert.      Comments: Awake, alert, able to answer questions appropriately, able to move extremities bilaterally.   Psychiatric:         Mood and Affect: Mood normal.         Behavior: Behavior normal.            Meds reviewed.  Medications Ordered Prior to Encounter[2]   Past Medical History[3]        Tobacco Use History[4]  Family History[5]                 [1] "   Past Surgical History:  Procedure Laterality Date    BUNIONECTOMY Right     TONSILLECTOMY     [2]   Current Outpatient Medications on File Prior to Visit   Medication Sig Dispense Refill    apixaban (Eliquis) 5 mg Take 1 tablet (5 mg total) by mouth 2 (two) times a day 180 tablet 1    aspirin 81 mg chewable tablet Chew 81 mg in the morning.      ciclopirox (PENLAC) 8 % solution Apply topically daily at bedtime 6 mL 11    meloxicam (MOBIC) 15 mg tablet Take 1 tablet (15 mg total) by mouth daily 90 tablet 1    metoprolol succinate (TOPROL-XL) 25 mg 24 hr tablet Take 1 tablet (25 mg total) by mouth 2 (two) times a day 180 tablet 1    Turmeric 500 MG CAPS Take 1,000 mg by mouth in the morning       No current facility-administered medications on file prior to visit.   [3]   Past Medical History:  Diagnosis Date    Allergic     Whole Life    Arthritis     X-ray    Dizziness     Kidney stone     Obesity     Palpitations     SOB (shortness of breath)     Tachycardia    [4]   Social History  Tobacco Use   Smoking Status Former    Current packs/day: 0.00    Average packs/day: 2.0 packs/day for 5.0 years (10.0 ttl pk-yrs)    Types: Cigarettes    Start date: 1977    Quit date:     Years since quittin.5   Smokeless Tobacco Never   [5]   Family History  Problem Relation Name Age of Onset    No Known Problems Mother      No Known Problems Father      No Known Problems Sister      No Known Problems Daughter      No Known Problems Daughter      No Known Problems Maternal Grandmother      No Known Problems Maternal Grandfather      No Known Problems Paternal Grandmother      No Known Problems Paternal Grandfather      No Known Problems Son      No Known Problems Maternal Aunt      No Known Problems Maternal Aunt      No Known Problems Maternal Aunt      No Known Problems Paternal Aunt

## 2025-07-23 NOTE — ASSESSMENT & PLAN NOTE
- Counseled patient on dietary and lifestyle modifications along with need for continued compliance with CPAP therapy  -Continue to monitor

## 2025-07-25 DIAGNOSIS — M19.90 ARTHRITIS: ICD-10-CM

## 2025-07-25 DIAGNOSIS — I48.0 PAROXYSMAL ATRIAL FIBRILLATION (HCC): ICD-10-CM

## 2025-07-25 DIAGNOSIS — I10 PRIMARY HYPERTENSION: ICD-10-CM

## 2025-07-25 RX ORDER — METOPROLOL SUCCINATE 25 MG/1
25 TABLET, EXTENDED RELEASE ORAL 2 TIMES DAILY
Qty: 180 TABLET | Refills: 1 | Status: SHIPPED | OUTPATIENT
Start: 2025-07-25

## 2025-07-28 RX ORDER — MELOXICAM 15 MG/1
15 TABLET ORAL DAILY
Qty: 90 TABLET | Refills: 1 | Status: SHIPPED | OUTPATIENT
Start: 2025-07-28

## 2025-07-29 ENCOUNTER — TELEPHONE (OUTPATIENT)
Age: 65
End: 2025-07-29

## 2025-07-29 DIAGNOSIS — I48.0 PAROXYSMAL ATRIAL FIBRILLATION (HCC): ICD-10-CM

## 2025-07-29 DIAGNOSIS — I10 PRIMARY HYPERTENSION: ICD-10-CM

## 2025-07-29 RX ORDER — METOPROLOL SUCCINATE 25 MG/1
25 TABLET, EXTENDED RELEASE ORAL 2 TIMES DAILY
Qty: 180 TABLET | Refills: 0 | Status: SHIPPED | OUTPATIENT
Start: 2025-07-29

## 2025-08-12 ENCOUNTER — OFFICE VISIT (OUTPATIENT)
Dept: FAMILY MEDICINE CLINIC | Facility: CLINIC | Age: 65
End: 2025-08-12
Payer: COMMERCIAL

## 2025-08-20 ENCOUNTER — RESULTS FOLLOW-UP (OUTPATIENT)
Dept: BARIATRICS | Facility: CLINIC | Age: 65
End: 2025-08-20